# Patient Record
Sex: MALE | Race: WHITE | Employment: FULL TIME | URBAN - METROPOLITAN AREA
[De-identification: names, ages, dates, MRNs, and addresses within clinical notes are randomized per-mention and may not be internally consistent; named-entity substitution may affect disease eponyms.]

---

## 2020-01-10 ENCOUNTER — TELEPHONE (OUTPATIENT)
Dept: FAMILY MEDICINE CLINIC | Age: 50
End: 2020-01-10

## 2020-01-13 ENCOUNTER — OFFICE VISIT (OUTPATIENT)
Dept: FAMILY MEDICINE CLINIC | Age: 50
End: 2020-01-13
Payer: COMMERCIAL

## 2020-01-13 VITALS
HEART RATE: 88 BPM | RESPIRATION RATE: 14 BRPM | SYSTOLIC BLOOD PRESSURE: 128 MMHG | BODY MASS INDEX: 21.94 KG/M2 | HEIGHT: 72 IN | WEIGHT: 162 LBS | DIASTOLIC BLOOD PRESSURE: 80 MMHG

## 2020-01-13 PROCEDURE — G8484 FLU IMMUNIZE NO ADMIN: HCPCS | Performed by: FAMILY MEDICINE

## 2020-01-13 PROCEDURE — 99203 OFFICE O/P NEW LOW 30 MIN: CPT | Performed by: FAMILY MEDICINE

## 2020-01-13 PROCEDURE — 1036F TOBACCO NON-USER: CPT | Performed by: FAMILY MEDICINE

## 2020-01-13 PROCEDURE — G8427 DOCREV CUR MEDS BY ELIG CLIN: HCPCS | Performed by: FAMILY MEDICINE

## 2020-01-13 PROCEDURE — G8420 CALC BMI NORM PARAMETERS: HCPCS | Performed by: FAMILY MEDICINE

## 2020-01-13 SDOH — ECONOMIC STABILITY: FOOD INSECURITY: WITHIN THE PAST 12 MONTHS, THE FOOD YOU BOUGHT JUST DIDN'T LAST AND YOU DIDN'T HAVE MONEY TO GET MORE.: NEVER TRUE

## 2020-01-13 SDOH — ECONOMIC STABILITY: TRANSPORTATION INSECURITY
IN THE PAST 12 MONTHS, HAS THE LACK OF TRANSPORTATION KEPT YOU FROM MEDICAL APPOINTMENTS OR FROM GETTING MEDICATIONS?: NO

## 2020-01-13 SDOH — ECONOMIC STABILITY: TRANSPORTATION INSECURITY
IN THE PAST 12 MONTHS, HAS LACK OF TRANSPORTATION KEPT YOU FROM MEETINGS, WORK, OR FROM GETTING THINGS NEEDED FOR DAILY LIVING?: NO

## 2020-01-13 SDOH — ECONOMIC STABILITY: FOOD INSECURITY: WITHIN THE PAST 12 MONTHS, YOU WORRIED THAT YOUR FOOD WOULD RUN OUT BEFORE YOU GOT MONEY TO BUY MORE.: NEVER TRUE

## 2020-01-13 SDOH — ECONOMIC STABILITY: INCOME INSECURITY: HOW HARD IS IT FOR YOU TO PAY FOR THE VERY BASICS LIKE FOOD, HOUSING, MEDICAL CARE, AND HEATING?: NOT HARD AT ALL

## 2020-01-13 ASSESSMENT — ENCOUNTER SYMPTOMS
SHORTNESS OF BREATH: 0
CHEST TIGHTNESS: 0
DIARRHEA: 1
ABDOMINAL PAIN: 0
NAUSEA: 0
EYES NEGATIVE: 1
BLOOD IN STOOL: 0
VOMITING: 0

## 2020-01-13 ASSESSMENT — PATIENT HEALTH QUESTIONNAIRE - PHQ9
SUM OF ALL RESPONSES TO PHQ QUESTIONS 1-9: 0
1. LITTLE INTEREST OR PLEASURE IN DOING THINGS: 0
SUM OF ALL RESPONSES TO PHQ QUESTIONS 1-9: 0
2. FEELING DOWN, DEPRESSED OR HOPELESS: 0
SUM OF ALL RESPONSES TO PHQ9 QUESTIONS 1 & 2: 0

## 2020-01-13 NOTE — PROGRESS NOTES
Pneumococcal 0-64 years Vaccine  Aged Out         ASSESSMENT      1.  Irritable bowel syndrome with diarrhea            PLAN       Trial of low FODMAP diet    Continue probiotic    Food diary    Follow up if not better             Electronically signed by Jeri Olivia MD on 1/13/2020 at 1:36 PM

## 2020-01-13 NOTE — PATIENT INSTRUCTIONS
Patient Education        Irritable Bowel Syndrome: Care Instructions  Your Care Instructions  Irritable bowel syndrome, or IBS, is a problem with the intestines that causes belly pain, bloating, gas, constipation, and diarrhea. The cause of IBS is not well known. IBS can last for many years, but it does not get worse over time or lead to serious disease. Most people can control their symptoms by changing their diet and reducing stress. Follow-up care is a key part of your treatment and safety. Be sure to make and go to all appointments, and call your doctor if you are having problems. It's also a good idea to know your test results and keep a list of the medicines you take. How can you care for yourself at home? · For constipation:  ? Include fruits, vegetables, beans, and whole grains in your diet each day. These foods are high in fiber. ? Drink plenty of fluids. If you have kidney, heart, or liver disease and have to limit fluids, talk with your doctor before you increase the amount of fluids you drink. ? Take a fiber supplement, such as Citrucel or Metamucil, every day if needed. Read and follow all instructions on the label. ? Schedule time each day for a bowel movement. Having a daily routine may help. Take your time and do not strain when having a bowel movement. · If you often have diarrhea, limit foods and drinks that make it worse. These are different for each person but may include caffeine (found in coffee, tea, chocolate, and cola drinks), alcohol, fatty foods, gas-producing foods (such as beans, cabbage, and broccoli), some dairy products, and spicy foods. Do not eat candy or gum that contains sorbitol. · Keep a daily diary of what you eat and what symptoms you have. This may help find foods that cause you problems. · Eat slowly. Try to make mealtime relaxing. · Find ways to reduce stress. · Get at least 30 minutes of exercise on most days of the week.  Exercise can help reduce tension and isaccharides. · M onosaccharides. · A nd p olyols. If you have digestive problems, some of these foods can make your symptoms worse. When you are on this diet, you can still eat certain fruits and vegetables. You can also eat certain grains, meats, fish, and lactose-free milks. What is it used for? If you have irritable bowel syndrome (IBS), you can ease your symptoms by not eating some types of foods. Some people also use this diet for inflammatory bowel disease (IBD) or some food intolerances. High-FODMAP foods can be hard to digest. They pull more fluid into your intestines. They are also easily fermented. This can lead to bloating, belly pain, gas, and diarrhea. The low-FODMAP diet can help you figure out what foods to avoid. And it can help you find foods that are easier to digest.  This diet can help with symptoms of some digestive diseases. But it's not a cure. You will still need to manage your condition. How does it work? You will work with a doctor or dietitian when you start the diet. At first, you won't eat any high-FODMAP foods for a few weeks. Go to www.Brenco. Share[employer] Ink to learn more about this diet. Min Chappell also find links to an bryan for your phone or other device. You'll find low-FODMAP cookbooks there too. After 6 to 8 weeks, you will start to try high-FODMAP foods again. You will add those foods back to your diet, one group at a time. Your doctor or dietitian will probably have you wait a few days before you add each new group of those foods. Keep a food diary. You can write down the foods you try and note how they make you feel. After a few weeks, you may have a better idea of what foods you should avoid and what foods make you feel your best.  What are the risks? There is some risk of not getting all of the vitamins and nutrients you need on the low-FODMAP diet. These include:  · Folate. · Thiamin. · Vitamin B6.  · Calcium. · Vitamin D.   Your dietitian or doctor can help you find other sources of these if needed. This diet may limit your fiber intake. Try to plan your meals to include other sources of fiber. What foods are on the low-FODMAP diet? Here is a guide to foods that you can eat, plus the foods that you should avoid, when you are on the low-FODMAP diet. Grains  Okay to eat: Foods made from grains like arrowroot, buckwheat, corn, millet, and oats. You can also eat potato, quinoa, rice, sorghum, tapioca, and teff. Cereals, pasta, breads, corn tortillas and baked goods made from these grains are also okay. (These grains may be labeled \"gluten-free. \")  Avoid: Grains like wheat, barley, and rye. Avoid ingredients such as bulgur, couscous, durum, and semolina. And avoid cereals, breads, and pastas made from these grains. Avoid chickpea, lentil, and pea flour. Proteins  Okay to eat: Most meat, fish, and eggs without high-FODMAP sauces. You can have small amounts of almonds or hazelnuts (10 nuts). Macadamia nuts, peanuts, pecans, pine nuts, and walnuts are also okay. You can also eat frankie and pumpkin seeds, tofu, and tempeh. Avoid: Beans, chickpeas, lentils, and soybeans. Avoid pistachio and cashew nuts. And some sausages may have high-FODMAP ingredients. Dairy  Okay to eat: Lactose-free dairy milks. Rice milk and almond milk are okay. So are lactose-free yogurts, kefirs, ice creams, and sorbet from low-FODMAP fruits and sweeteners. (These are often labeled \"lactose-free. \") You can have small amounts (2 Tbsp) of cottage, cream, or ricotta cheese. Hard cheeses like cheddar, [place], ROSS, and Swiss are okay. So are small amounts (1 oz) of aged or ripened cheeses like Brie, blue, and feta. Avoid: Milk, including cow, goat, and sheep. Avoid condensed or evaporated milk, buttermilk, custard, cream, sour cream, yogurt, and ice cream. Avoid soy milk. (Check sauces for dairy ingredients.)  Vegetables  Okay to eat:  Bamboo shoots, bell peppers, bok jocelyn, up to ½ cup of broccoli or cabbage and gravies made with onion or garlic. Avoid pickles, relish, some salad dressings and soup stocks, salsa, and tomato paste. And avoid sauces and other foods with high fructose corn syrup, honey, molasses, and agave. Avoid artificial sweeteners (isomalt, mannitol, malitol, sorbitol, and xylitol). Avoid corn syrup solids, fructose, fruit juice concentrate, and polydextrose. Other foods and drinks  Okay to have: Water, soda water, tonic, soft drinks sweetened with sugar, ½ cup of low-FODMAP fruit juice, and most teas and alcohols. You can also eat foods made with baking powder and soda, cocoa, and gelatin. Avoid: Juices from high-FODMAP fruits and vegetables. And avoid fortified hal, chamomile and fennel teas, chicory-based drinks and coffee substitutes, and bouillon cubes. Follow-up care is a key part of your treatment and safety. Be sure to make and go to all appointments, and call your doctor if you are having problems. It's also a good idea to know your test results and keep a list of the medicines you take. Where can you learn more? Go to https://Jalbumpepiceweb.[employer]. org and sign in to your [email or web address] account. Enter L235 in the [facility] box to learn more about \"Learning About the Low FODMAP Diet for Irritable Bowel Syndrome (IBS). \"     If you do not have an account, please click on the \"Sign Up Now\" link. Current as of: August 21, 2019  Content Version: 12.3  © 8427-8545 Healthwise, Incorporated. Care instructions adapted under license by [facility] ([facility]). If you have questions about a medical condition or this instruction, always ask your healthcare professional. [place] [address] any warranty or liability for your use of this information.

## 2020-06-02 ENCOUNTER — OFFICE VISIT (OUTPATIENT)
Dept: FAMILY MEDICINE CLINIC | Age: 50
End: 2020-06-02
Payer: COMMERCIAL

## 2020-06-02 VITALS
RESPIRATION RATE: 14 BRPM | BODY MASS INDEX: 21.62 KG/M2 | HEART RATE: 84 BPM | TEMPERATURE: 98.9 F | DIASTOLIC BLOOD PRESSURE: 80 MMHG | WEIGHT: 159.6 LBS | HEIGHT: 72 IN | SYSTOLIC BLOOD PRESSURE: 132 MMHG

## 2020-06-02 PROCEDURE — 99213 OFFICE O/P EST LOW 20 MIN: CPT | Performed by: FAMILY MEDICINE

## 2020-06-02 PROCEDURE — 1036F TOBACCO NON-USER: CPT | Performed by: FAMILY MEDICINE

## 2020-06-02 PROCEDURE — G8427 DOCREV CUR MEDS BY ELIG CLIN: HCPCS | Performed by: FAMILY MEDICINE

## 2020-06-02 PROCEDURE — G8420 CALC BMI NORM PARAMETERS: HCPCS | Performed by: FAMILY MEDICINE

## 2020-06-02 RX ORDER — HYDROCORTISONE ACETATE PRAMOXINE HCL 2.5; 1 G/100G; G/100G
CREAM TOPICAL 3 TIMES DAILY
Qty: 1 TUBE | Refills: 1 | Status: SHIPPED | OUTPATIENT
Start: 2020-06-02 | End: 2020-10-29 | Stop reason: ALTCHOICE

## 2020-06-02 RX ORDER — IBUPROFEN 200 MG
200 TABLET ORAL EVERY 6 HOURS PRN
COMMUNITY
End: 2020-10-29 | Stop reason: ALTCHOICE

## 2020-06-02 ASSESSMENT — ENCOUNTER SYMPTOMS
NAUSEA: 0
BLOOD IN STOOL: 0
DIARRHEA: 0
ABDOMINAL PAIN: 0
RESPIRATORY NEGATIVE: 1
RECTAL PAIN: 1
CONSTIPATION: 0

## 2020-06-02 NOTE — PROGRESS NOTES
Chief Complaint   Patient presents with    Hemorrhoids     started over a week ago, OTC remedies not helping, no bleeding          SUBJECTIVE     Florinda Hodges is a 51 y.o.male      Pt complains of a 1 week h/o perirectal pain and inflammation around an external hemorrhoid. He has tried sitz baths and Prep H without much relief. Denies constipation or bleeding. Hurts with prolonged sitting. Doesn't bother him with standing or walking. He would like a Rx med for this. Review of Systems   Constitutional: Negative for fatigue and fever. HENT: Negative. Respiratory: Negative. Cardiovascular: Negative. Gastrointestinal: Positive for rectal pain. Negative for abdominal pain, blood in stool, constipation, diarrhea and nausea. Neurological: Negative. All other systems reviewed and are negative. OBJECTIVE     /80   Pulse 84   Temp 98.9 °F (37.2 °C) (Temporal)   Resp 14   Ht 6' 0.24\" (1.835 m)   Wt 159 lb 9.6 oz (72.4 kg)   BMI 21.50 kg/m²     Physical Exam  Vitals signs reviewed. Constitutional:       General: He is not in acute distress. Appearance: Normal appearance. Genitourinary:     Rectum: External hemorrhoid present. No anal fissure. Neurological:      Mental Status: He is alert. ASSESSMENT      1.  Inflamed external hemorrhoid        PLAN     Requested Prescriptions     Signed Prescriptions Disp Refills    Hydrocort-Pramoxine, Perianal, (ANALPRAM-HC) 2.5-1 % rectal cream 1 Tube 1     Sig: Place rectally 3 times daily     Sitz baths    Tucks pads    Follow up if not better            Electronically signed by Simone Gallegos MD on 6/2/2020 at 12:15 PM

## 2020-08-10 ENCOUNTER — OFFICE VISIT (OUTPATIENT)
Dept: FAMILY MEDICINE CLINIC | Age: 50
End: 2020-08-10
Payer: COMMERCIAL

## 2020-08-10 VITALS
TEMPERATURE: 97.1 F | SYSTOLIC BLOOD PRESSURE: 162 MMHG | RESPIRATION RATE: 16 BRPM | DIASTOLIC BLOOD PRESSURE: 84 MMHG | HEART RATE: 104 BPM | BODY MASS INDEX: 20.85 KG/M2 | WEIGHT: 154.8 LBS

## 2020-08-10 PROCEDURE — 99396 PREV VISIT EST AGE 40-64: CPT | Performed by: FAMILY MEDICINE

## 2020-08-10 ASSESSMENT — ENCOUNTER SYMPTOMS
CHEST TIGHTNESS: 0
DIARRHEA: 1
EYES NEGATIVE: 1
VOMITING: 0
BLOOD IN STOOL: 0
ABDOMINAL PAIN: 1
SHORTNESS OF BREATH: 0
NAUSEA: 0

## 2020-08-10 NOTE — PROGRESS NOTES
Chief Complaint   Patient presents with    Diarrhea     C/O symptoms of diarrhea that have started to happen again x 1 month.  Annual Exam           SUBJECTIVE     Storm Zhao is a 51 y.o.male      Pt complains of a return of diarrhea over the last month. He has been taking probiotics and his bowels were improved for a number of months. He is unsure of why they are loose again. Denies black or bloody stools. Has lost about 8# in the last 6 months. C/o some abdominal cramping before he has diarrhea. Usually happens in the morning. Due for yearly labs. Nonsmoker. Body mass index is 20.85 kg/m². BPs are high today. He states that he has \"white coat\" HTN      Review of Systems   Constitutional: Positive for unexpected weight change (loss). Negative for chills, fatigue and fever. HENT: Negative. Eyes: Negative. Respiratory: Negative for chest tightness and shortness of breath. Cardiovascular: Negative for chest pain, palpitations and leg swelling. Gastrointestinal: Positive for abdominal pain and diarrhea. Negative for blood in stool, nausea and vomiting. Genitourinary: Negative for dysuria. Musculoskeletal: Negative. Skin: Negative for rash. Neurological: Negative for dizziness. Psychiatric/Behavioral: Negative. All other systems reviewed and are negative. OBJECTIVE     BP (!) 162/84   Pulse 104   Temp 97.1 °F (36.2 °C) (Temporal)   Resp 16   Wt 154 lb 12.8 oz (70.2 kg)   BMI 20.85 kg/m²     BP Readings from Last 3 Encounters:   08/10/20 (!) 162/84   06/02/20 132/80   01/13/20 128/80     Wt Readings from Last 3 Encounters:   08/10/20 154 lb 12.8 oz (70.2 kg)   06/02/20 159 lb 9.6 oz (72.4 kg)   01/13/20 162 lb (73.5 kg)       Physical Exam  Vitals signs reviewed. Constitutional:       General: He is not in acute distress. Appearance: He is well-developed. HENT:      Head: Normocephalic and atraumatic.       Right Ear: Tympanic membrane normal.      Left Ear: Tympanic membrane normal.      Mouth/Throat:      Mouth: Mucous membranes are moist.      Pharynx: No posterior oropharyngeal erythema. Eyes:      Conjunctiva/sclera: Conjunctivae normal.   Neck:      Musculoskeletal: Neck supple. Thyroid: No thyromegaly. Vascular: No carotid bruit. Cardiovascular:      Rate and Rhythm: Normal rate and regular rhythm. Heart sounds: No murmur. Pulmonary:      Effort: Pulmonary effort is normal.      Breath sounds: Normal breath sounds. No wheezing. Abdominal:      General: Bowel sounds are normal.      Palpations: Abdomen is soft. Tenderness: There is no abdominal tenderness. Lymphadenopathy:      Cervical: No cervical adenopathy. Skin:     General: Skin is warm and dry. Findings: No rash. Neurological:      Mental Status: He is alert and oriented to person, place, and time. Psychiatric:         Behavior: Behavior normal.           ASSESSMENT      1. Annual physical exam    2.  Diarrhea, unspecified type        PLAN     Update labs as below    Refer to GI for diarrhea and abdominal symptoms    Continue probiotics      Orders Placed This Encounter   Procedures    CBC Auto Differential     Standing Status:   Future     Standing Expiration Date:   8/11/2021    Comprehensive Metabolic Panel     Standing Status:   Future     Standing Expiration Date:   8/10/2021    Lipid Panel     Standing Status:   Future     Standing Expiration Date:   8/10/2021     Order Specific Question:   Is Patient Fasting?/# of Hours     Answer:   12    TSH without Reflex     Standing Status:   Future     Standing Expiration Date:   8/11/2021    T4, Free     Standing Status:   Future     Standing Expiration Date:   8/10/2021    MAGALI - Ronda Corbin MD, Gastroenterology, [facility]     Referral Priority:   Routine     Referral Type:   Eval and Treat     Referral Reason:   Specialty Services Required     Referred to Provider:   Ericka Hagan MD     Requested Specialty:   Gastroenterology     Number of Visits Requested:   1         Follow up if not better            Electronically signed by Misael Hernández MD on 8/10/2020 at 5:02 PM

## 2020-08-13 ENCOUNTER — HOSPITAL ENCOUNTER (OUTPATIENT)
Age: 50
Discharge: HOME | End: 2020-08-13
Payer: COMMERCIAL

## 2020-08-13 LAB
ALBUMIN SERPL-MCNC: 3.5 G/DL (ref 3.5–5.1)
ALP BLD-CCNC: 100 U/L (ref 38–126)
ALT SERPL-CCNC: 11 U/L (ref 11–66)
ANION GAP SERPL CALCULATED.3IONS-SCNC: 11 MEQ/L (ref 8–16)
AST SERPL-CCNC: 18 U/L (ref 5–40)
BASOPHILS # BLD: 1.1 %
BASOPHILS ABSOLUTE: 0.1 THOU/MM3 (ref 0–0.1)
BILIRUB SERPL-MCNC: 0.8 MG/DL (ref 0.3–1.2)
BUN BLDV-MCNC: 14 MG/DL (ref 7–22)
CALCIUM SERPL-MCNC: 9 MG/DL (ref 8.5–10.5)
CHLORIDE BLD-SCNC: 104 MEQ/L (ref 98–111)
CHOLESTEROL, TOTAL: 134 MG/DL (ref 100–199)
CO2: 26 MEQ/L (ref 23–33)
CREAT SERPL-MCNC: 1 MG/DL (ref 0.4–1.2)
EOSINOPHIL # BLD: 1 %
EOSINOPHILS ABSOLUTE: 0.1 THOU/MM3 (ref 0–0.4)
ERYTHROCYTE [DISTWIDTH] IN BLOOD BY AUTOMATED COUNT: 12.3 % (ref 11.5–14.5)
ERYTHROCYTE [DISTWIDTH] IN BLOOD BY AUTOMATED COUNT: 38.9 FL (ref 35–45)
GFR SERPL CREATININE-BSD FRML MDRD: 80 ML/MIN/1.73M2
GLUCOSE BLD-MCNC: 98 MG/DL (ref 70–108)
HCT VFR BLD CALC: 46.2 % (ref 42–52)
HDLC SERPL-MCNC: 37 MG/DL
HEMOGLOBIN: 15.4 GM/DL (ref 14–18)
IMMATURE GRANS (ABS): 0.03 THOU/MM3 (ref 0–0.07)
IMMATURE GRANULOCYTES: 0.4 %
LDL CHOLESTEROL CALCULATED: 80 MG/DL
LYMPHOCYTES # BLD: 20.4 %
LYMPHOCYTES ABSOLUTE: 1.5 THOU/MM3 (ref 1–4.8)
MCH RBC QN AUTO: 29.1 PG (ref 26–33)
MCHC RBC AUTO-ENTMCNC: 33.3 GM/DL (ref 32.2–35.5)
MCV RBC AUTO: 87.2 FL (ref 80–94)
MONOCYTES # BLD: 12.2 %
MONOCYTES ABSOLUTE: 0.9 THOU/MM3 (ref 0.4–1.3)
NUCLEATED RED BLOOD CELLS: 0 /100 WBC
PLATELET # BLD: 340 THOU/MM3 (ref 130–400)
PMV BLD AUTO: 8.6 FL (ref 9.4–12.4)
POTASSIUM SERPL-SCNC: 3.8 MEQ/L (ref 3.5–5.2)
RBC # BLD: 5.3 MILL/MM3 (ref 4.7–6.1)
SEG NEUTROPHILS: 64.9 %
SEGMENTED NEUTROPHILS ABSOLUTE COUNT: 4.8 THOU/MM3 (ref 1.8–7.7)
SODIUM BLD-SCNC: 141 MEQ/L (ref 135–145)
T4 FREE: 1.66 NG/DL (ref 0.93–1.76)
TOTAL PROTEIN: 6.9 G/DL (ref 6.1–8)
TRIGL SERPL-MCNC: 86 MG/DL (ref 0–199)
TSH SERPL DL<=0.05 MIU/L-ACNC: 1.93 UIU/ML (ref 0.4–4.2)
WBC # BLD: 7.4 THOU/MM3 (ref 4.8–10.8)

## 2020-08-13 PROCEDURE — 84443 ASSAY THYROID STIM HORMONE: CPT

## 2020-08-13 PROCEDURE — 85025 COMPLETE CBC W/AUTO DIFF WBC: CPT

## 2020-08-13 PROCEDURE — 36415 COLL VENOUS BLD VENIPUNCTURE: CPT

## 2020-08-13 PROCEDURE — 84439 ASSAY OF FREE THYROXINE: CPT

## 2020-08-13 PROCEDURE — 80061 LIPID PANEL: CPT

## 2020-08-13 PROCEDURE — 80053 COMPREHEN METABOLIC PANEL: CPT

## 2020-08-20 ENCOUNTER — HOSPITAL ENCOUNTER (OUTPATIENT)
Age: 50
Setting detail: SPECIMEN
Discharge: HOME | End: 2020-08-20
Payer: COMMERCIAL

## 2020-08-20 LAB
ADENOVIRUS F 40 41 PCR: NOT DETECTED
ASTROVIRUS PCR: NOT DETECTED
CAMPYLOBACTER PCR: NOT DETECTED
CLOSTRIDIUM DIFFICILE, PCR: NOT DETECTED
CRYPTOSPORIDIUM PCR: NOT DETECTED
CYCLOSPORA CAYETANENSIS PCR: NOT DETECTED
E COLI 0157 PCR: NORMAL
E COLI ENTEROAGGREGATIVE PCR: NOT DETECTED
E COLI ENTEROPATHOGENIC PCR: NOT DETECTED
E COLI ENTEROTOXIGENIC PCR: NOT DETECTED
E COLI SHIGA LIKE TOXIN PCR: NOT DETECTED
E COLI SHIGELLA/ENTEROINVASIVE PCR: NOT DETECTED
E HISTOLYTICA GI FILM ARRAY: NOT DETECTED
GIARDIA LAMBLIA PCR: NOT DETECTED
HEMOCCULT STL QL: NEGATIVE
NOROVIRUS GI GII PCR: NOT DETECTED
PLESIOMONAS SHIGELLOIDES PCR: NOT DETECTED
REASON FOR REJECTION: NORMAL
REJECTED TEST: NORMAL
ROTAVIRUS A PCR: NOT DETECTED
SALMONELLA PCR: NOT DETECTED
SAPOVIRUS PCR: NOT DETECTED
VIBRIO CHOLERAE PCR: NOT DETECTED
VIBRIO PCR: NOT DETECTED
YERSINIA ENTEROCOLITICA PCR: NOT DETECTED

## 2020-08-20 PROCEDURE — 83993 ASSAY FOR CALPROTECTIN FECAL: CPT

## 2020-08-20 PROCEDURE — 0097U HC GI PTHGN MULT REV TRANS & AMP PRB TECH 22 TRGT: CPT

## 2020-08-20 PROCEDURE — 82272 OCCULT BLD FECES 1-3 TESTS: CPT

## 2020-08-25 LAB — CALPROTECTIN: 1810 UG/G

## 2020-10-01 ENCOUNTER — TELEMEDICINE (OUTPATIENT)
Dept: FAMILY MEDICINE CLINIC | Age: 50
End: 2020-10-01
Payer: COMMERCIAL

## 2020-10-01 PROCEDURE — 99213 OFFICE O/P EST LOW 20 MIN: CPT | Performed by: FAMILY MEDICINE

## 2020-10-01 PROCEDURE — G8427 DOCREV CUR MEDS BY ELIG CLIN: HCPCS | Performed by: FAMILY MEDICINE

## 2020-10-01 RX ORDER — CIPROFLOXACIN 500 MG/1
500 TABLET, FILM COATED ORAL 2 TIMES DAILY
Qty: 20 TABLET | Refills: 0 | Status: SHIPPED | OUTPATIENT
Start: 2020-10-01 | End: 2020-10-11

## 2020-10-01 RX ORDER — BUDESONIDE 3 MG/1
3 CAPSULE, COATED PELLETS ORAL DAILY
COMMUNITY
Start: 2020-09-29 | End: 2020-10-29 | Stop reason: DRUGHIGH

## 2020-10-01 ASSESSMENT — ENCOUNTER SYMPTOMS
BACK PAIN: 1
RESPIRATORY NEGATIVE: 1
ABDOMINAL PAIN: 1

## 2020-10-01 NOTE — PATIENT INSTRUCTIONS
Patient Education        Prostatitis: Care Instructions  Your Care Instructions     The prostate gland is a small, walnut-shaped organ. It lies just below a man's bladder. It surrounds the urethra, the tube that carries urine through the penis and out of the body. Prostatitis is a painful condition caused by inflammation or infection of the prostate gland. Sometimes the condition is caused by bacteria, but often the cause is not known. Prostatitis caused by bacteria usually is treated with self-care and antibiotics. Follow-up care is a key part of your treatment and safety. Be sure to make and go to all appointments, and call your doctor if you are having problems. It's also a good idea to know your test results and keep a list of the medicines you take. How can you care for yourself at home? · If your doctor prescribed antibiotics, take them as directed. Do not stop taking them just because you feel better. You need to take the full course of antibiotics. · Take an over-the-counter pain medicine, such as acetaminophen (Tylenol), ibuprofen (Advil, Motrin), or naproxen (Aleve). Be safe with medicines. Read and follow all instructions on the label. · Take warm baths to help soothe pain. · Straining to pass stools can hurt when your prostate is inflamed. Avoid constipation. ? Include fruits, vegetables, beans, and whole grains in your diet each day. These foods are high in fiber. ? Drink plenty of fluids, enough so that your urine is light yellow or clear like water. If you have kidney, heart, or liver disease and have to limit fluids, talk with your doctor before you increase the amount of fluids you drink. ? Get some exercise every day. Build up slowly to 30 to 60 minutes a day on 5 or more days of the week. ? Take a fiber supplement, such as Citrucel or Metamucil, every day if needed. Read and follow all instructions on the label. ? Schedule time each day for a bowel movement.  Having a daily routine may help. Take your time and do not strain when having a bowel movement. · Avoid alcohol, caffeine, and spicy foods, especially if they make your symptoms worse. When should you call for help? Call your doctor now or seek immediate medical care if:  · You have symptoms of a urinary tract infection. These may include:  ? Pain or burning when you urinate. ? A frequent need to urinate without being able to pass much urine. ? Pain in the flank, which is just below the rib cage and above the waist on either side of the back. ? Blood in your urine. ? A fever. Watch closely for changes in your health, and be sure to contact your doctor if:  · You cannot empty your bladder completely. · You do not get better as expected. Where can you learn more? Go to https://SalesLoft.Haozu.com. org and sign in to your [employer] account. Enter V364 in the [employer]Flow box to learn more about \"Prostatitis: Care Instructions. \"     If you do not have an account, please click on the \"Sign Up Now\" link. Current as of: February 11, 2020               Content Version: 12.5  © 3121-0833 Healthwise, Incorporated. Care instructions adapted under license by [facility] ([facility]). If you have questions about a medical condition or this instruction, always ask your healthcare professional. [address] any warranty or liability for your use of this information.

## 2020-10-01 NOTE — PROGRESS NOTES
10/1/2020          Patient location:  Home  Provider location:  [facility]  Accept Video Visit Billing:  Yes    TELEHEALTH EVALUATION -- Audio/Visual (During [id number] public health emergency)    HPI:    Maureen Zuñiga (:  1970) has requested an audio/video evaluation for the following concern(s):    C/o 1 month h/o urinary frequency, dysuria, perineal pain. He has some lower back and lower abdominal pressure at times. No fever, chills, sweats. He recently had a GI workup and is now being treated for possible Crohn's disease. He denies testicular pain. He feels like he has an infection and would like to try some antibiotics before coming in for any exam.    Review of Systems   Constitutional: Negative for chills, diaphoresis and fever. HENT: Negative. Respiratory: Negative. Cardiovascular: Negative. Gastrointestinal: Positive for abdominal pain. Genitourinary: Positive for dysuria and frequency. Negative for discharge, hematuria, scrotal swelling, testicular pain and urgency. Musculoskeletal: Positive for back pain. All other systems reviewed and are negative. Outpatient Medications Prior to Visit   Medication Sig Dispense Refill    budesonide (ENTOCORT EC) 3 MG extended release capsule Take 3 capsules by mouth daily      ibuprofen (ADVIL;MOTRIN) 200 MG tablet Take 200 mg by mouth every 6 hours as needed for Pain      Hydrocort-Pramoxine, Perianal, (ANALPRAM-HC) 2.5-1 % rectal cream Place rectally 3 times daily (Patient not taking: Reported on 8/10/2020) 1 Tube 1    Probiotic Product (PROBIOTIC ADVANCED PO) Take by mouth daily        No facility-administered medications prior to visit. Social History     Tobacco Use    Smoking status: Never Smoker    Smokeless tobacco: Never Used   Substance Use Topics    Alcohol use: No    Drug use:  No            PHYSICAL EXAMINATION:  [ INSTRUCTIONS:  \"[x]\" Indicates a positive item  \"[]\" Indicates a negative item  -- DELETE ALL ITEMS NOT EXAMINED]  Vital Signs: (As obtained by patient/caregiver or practitioner observation)     Blood pressure-          Heart rate-         Respiratory rate-         Temperature-            Pulse oximetry-      Constitutional: [x] Appears well-developed and well-nourished [x] No apparent distress                            [] Abnormal-   Mental status  [x] Alert and awake  [x] Oriented to person/place/time [x]Able to follow commands       Eyes:  EOM    [x]  Normal  [] Abnormal-  Sclera  [x]  Normal  [] Abnormal -         Discharge [x]  None visible  [] Abnormal -     HENT:   [x] Normocephalic, atraumatic. [] Abnormal   [x] Mouth/Throat: Mucous membranes are moist.      External Ears [x] Normal  [] Abnormal-      Neck: [x] No visualized mass      Pulmonary/Chest: [x] Respiratory effort normal.  [x] No visualized signs of difficulty breathing or respiratory distress        [] Abnormal-      Musculoskeletal:   [] Normal gait with no signs of ataxia         [x] Normal range of motion of neck        [] Abnormal-         Neurological:        [x] No Facial Asymmetry (Cranial nerve 7 motor function) (limited exam to video visit)                       [x] No gaze palsy        [] Abnormal-         Skin:                     [x] No significant exanthematous lesions or discoloration noted on facial skin         [] Abnormal-                                  Psychiatric:           [x] Normal Affect [] No Hallucinations        [] Abnormal-      Other pertinent observable physical exam findings- none      ASSESSMENT/PLAN:    1. Acute prostatitis    Will treat for presumed prostatitis as below. Appt for in person evaluation if not better. - ciprofloxacin (CIPRO) 500 MG tablet; Take 1 tablet by mouth 2 times daily for 10 days  Dispense: 20 tablet; Refill: 0    2. Dysuria    - ciprofloxacin (CIPRO) 500 MG tablet; Take 1 tablet by mouth 2 times daily for 10 days  Dispense: 20 tablet;  Refill: [id number]

## 2020-10-29 ENCOUNTER — OFFICE VISIT (OUTPATIENT)
Dept: FAMILY MEDICINE CLINIC | Age: 50
End: 2020-10-29
Payer: COMMERCIAL

## 2020-10-29 VITALS
BODY MASS INDEX: 20.69 KG/M2 | RESPIRATION RATE: 16 BRPM | SYSTOLIC BLOOD PRESSURE: 154 MMHG | DIASTOLIC BLOOD PRESSURE: 100 MMHG | HEART RATE: 92 BPM | WEIGHT: 153.6 LBS | TEMPERATURE: 97 F

## 2020-10-29 PROCEDURE — G8427 DOCREV CUR MEDS BY ELIG CLIN: HCPCS | Performed by: FAMILY MEDICINE

## 2020-10-29 PROCEDURE — G8420 CALC BMI NORM PARAMETERS: HCPCS | Performed by: FAMILY MEDICINE

## 2020-10-29 PROCEDURE — 1036F TOBACCO NON-USER: CPT | Performed by: FAMILY MEDICINE

## 2020-10-29 PROCEDURE — 99213 OFFICE O/P EST LOW 20 MIN: CPT | Performed by: FAMILY MEDICINE

## 2020-10-29 PROCEDURE — G8482 FLU IMMUNIZE ORDER/ADMIN: HCPCS | Performed by: FAMILY MEDICINE

## 2020-10-29 RX ORDER — ACETAMINOPHEN 325 MG/1
650 TABLET ORAL EVERY 6 HOURS PRN
COMMUNITY

## 2020-10-29 RX ORDER — AMOXICILLIN AND CLAVULANATE POTASSIUM 875; 125 MG/1; MG/1
1 TABLET, FILM COATED ORAL 2 TIMES DAILY
Qty: 20 TABLET | Refills: 0 | Status: SHIPPED | OUTPATIENT
Start: 2020-10-29 | End: 2020-11-08

## 2020-10-29 RX ORDER — BUDESONIDE 3 MG/1
6 CAPSULE, COATED PELLETS ORAL DAILY
Status: SHIPPED | COMMUNITY
Start: 2020-10-29 | End: 2021-01-21

## 2020-10-29 ASSESSMENT — ENCOUNTER SYMPTOMS: RESPIRATORY NEGATIVE: 1

## 2020-10-29 NOTE — PROGRESS NOTES
Chief Complaint   Patient presents with    Other     Recently seen for VV for prostatitis. Completed 10 days of Cipro. (2 weeks ago) Patient states that symptoms never resolved but did improve. Still with pressure in scrotal area. Donavon May is a 51 y.o.male      Pt complains of tenderness in the perineal area that is worse with sitting or prolonged standing. He states that the tenderness doesn't bother him when he lays flat. No known trauma to the area. No fever, chills. He states that it feels like there is a nerve irritation in the area. We did a virtual visit where he reported similar symptoms on 10/1/20 and he was treated with cipro for possible prostatitis. He states that he improved slightly but they symptoms never resolved. He is otherwise well. He brings home BPs for review. They run from 120-130s/80s. Review of Systems   Constitutional: Negative for fatigue and fever. HENT: Negative. Respiratory: Negative. Cardiovascular: Negative. Genitourinary: Negative for difficulty urinating, scrotal swelling and testicular pain. Musculoskeletal: Negative. All other systems reviewed and are negative. OBJECTIVE     BP (!) 154/100   Pulse 92   Temp 97 °F (36.1 °C) (Temporal)   Resp 16   Wt 153 lb 9.6 oz (69.7 kg)   BMI 20.69 kg/m²     Physical Exam  Vitals signs reviewed. Constitutional:       General: He is not in acute distress. Genitourinary:      Neurological:      Mental Status: He is alert. ASSESSMENT      1. Cellulitis of perineum    2. Perineal pain in male    3. Transient elevated blood pressure        PLAN     Requested Prescriptions     Signed Prescriptions Disp Refills    amoxicillin-clavulanate (AUGMENTIN) 875-125 MG per tablet 20 tablet 0     Sig: Take 1 tablet by mouth 2 times daily for 10 days     Ddx includes cellulitis, pudendal neuralgia, prostatitis.     Will treat with augmentin for possible cellulitis, as he improved slightly with antibiotics previously.     Avoid prolonged sitting on hard surfaces    Urology eval if not improved            Electronically signed by Lima Ramirez MD on 10/29/2020 at 11:15 AM

## 2020-11-17 ENCOUNTER — TELEPHONE (OUTPATIENT)
Dept: FAMILY MEDICINE CLINIC | Age: 50
End: 2020-11-17

## 2020-11-17 NOTE — TELEPHONE ENCOUNTER
Please refer to office visits from 10/1 and 10/29. Message left on nurse NANCY. States that ATB is not helping and wonders if he should be pursuing a referral to urology at this point. If so, recommendations?

## 2020-11-19 ENCOUNTER — TELEPHONE (OUTPATIENT)
Dept: FAMILY MEDICINE CLINIC | Age: 50
End: 2020-11-19

## 2020-11-19 NOTE — TELEPHONE ENCOUNTER
Discussed with patient and referral placed to [facility]. They will call the patient to schedule.

## 2020-11-19 NOTE — TELEPHONE ENCOUNTER
The medication he was taking was not working, wants to know if he should go ahead and schedule with Urology or speak with the Dr again, or what the next steps would be.

## 2020-12-08 ENCOUNTER — OFFICE VISIT (OUTPATIENT)
Dept: UROLOGY | Age: 50
End: 2020-12-08
Payer: COMMERCIAL

## 2020-12-08 VITALS — TEMPERATURE: 97.1 F | BODY MASS INDEX: 21.54 KG/M2 | WEIGHT: 159 LBS | HEIGHT: 72 IN

## 2020-12-08 LAB
BILIRUBIN URINE: NEGATIVE
BLOOD URINE, POC: ABNORMAL
CHARACTER, URINE: CLEAR
COLOR, URINE: YELLOW
GLUCOSE URINE: NEGATIVE MG/DL
KETONES, URINE: NEGATIVE
LEUKOCYTE CLUMPS, URINE: NEGATIVE
NITRITE, URINE: NEGATIVE
PH, URINE: 6 (ref 5–9)
POST VOID RESIDUAL (PVR): 0 ML
PROTEIN, URINE: NEGATIVE MG/DL
SPECIFIC GRAVITY, URINE: >= 1.03 (ref 1–1.03)
UROBILINOGEN, URINE: 0.2 EU/DL (ref 0–1)

## 2020-12-08 PROCEDURE — 1036F TOBACCO NON-USER: CPT | Performed by: UROLOGY

## 2020-12-08 PROCEDURE — G8482 FLU IMMUNIZE ORDER/ADMIN: HCPCS | Performed by: UROLOGY

## 2020-12-08 PROCEDURE — 81003 URINALYSIS AUTO W/O SCOPE: CPT | Performed by: UROLOGY

## 2020-12-08 PROCEDURE — 51798 US URINE CAPACITY MEASURE: CPT | Performed by: UROLOGY

## 2020-12-08 PROCEDURE — 99204 OFFICE O/P NEW MOD 45 MIN: CPT | Performed by: UROLOGY

## 2020-12-08 PROCEDURE — G8427 DOCREV CUR MEDS BY ELIG CLIN: HCPCS | Performed by: UROLOGY

## 2020-12-08 PROCEDURE — G8420 CALC BMI NORM PARAMETERS: HCPCS | Performed by: UROLOGY

## 2020-12-08 RX ORDER — GABAPENTIN 300 MG/1
300 CAPSULE ORAL NIGHTLY
Qty: 30 CAPSULE | Refills: 5 | Status: SHIPPED | OUTPATIENT
Start: 2020-12-08 | End: 2021-03-18

## 2020-12-08 RX ORDER — OMEGA-3 FATTY ACIDS/FISH OIL 300-1000MG
CAPSULE ORAL
COMMUNITY

## 2020-12-08 RX ORDER — TAMSULOSIN HYDROCHLORIDE 0.4 MG/1
0.4 CAPSULE ORAL DAILY
Qty: 30 CAPSULE | Refills: 11 | Status: SHIPPED | OUTPATIENT
Start: 2020-12-08 | End: 2021-03-18

## 2020-12-08 RX ORDER — DOXYCYCLINE HYCLATE 100 MG
100 TABLET ORAL 2 TIMES DAILY
Qty: 60 TABLET | Refills: 0 | Status: SHIPPED | OUTPATIENT
Start: 2020-12-08 | End: 2021-01-07

## 2020-12-08 NOTE — PROGRESS NOTES
Caprice Whaley MD        [address] [place] [address].  [address]  [facility] [address]  Dept: [phone]  Dept Fax: [phone]: [address] [address] Urology Office Note -     Patient: Blessing Junior  YOB: 1970  Date: 12/8/2020    The patient is a 53 y.o. male who presents today for evaluation of the following problems: Prostatitis   Chief Complaint   Patient presents with    Consultation     New Pateint- Ref by Rito Sport -Perineal Pain     referred/consultation requested by Bre Schultz MD.    HISTORY OF PRESENT ILLNESS:     Prostatitis   Onset was weeks ago. Overall, the problem(s) are unchanged. Severity is described as moderate. Associated Symptoms: No dysuria, no gross hematuria. Current Pain Severity: 0    Pt recently seen by PCP- Dr. Sera Rao  Pt complains of tenderness in the perineal area that worsens with prolonged sitting or standing. Reports no trauma to the area. He was treated in October 2020 with Cipro for 10 days for possible prostatitis. Symptoms improved slightly but never resolved. No imaging obtained. UA today negative. PVR today- completely empty. Summary of Previous Records:  Pt complains of tenderness in the perineal area that is worse with sitting or prolonged standing. He states that the tenderness doesn't bother him when he lays flat. No known trauma to the area. No fever, chills. He states that it feels like there is a nerve irritation in the area. We did a virtual visit where he reported similar symptoms on 10/1/20 and he was treated with cipro for possible prostatitis. He states that he improved slightly but they symptoms never resolved. He is otherwise well.         Requested/reviewed records from Bre Schultz MD office and/or outside physician/EMR    (Patient's old records have been requested, reviewed and pertinent findings summarized in today's note.)    Procedures Today: N/A      Last several PSA's:  No results found for: PSA    Last total testosterone:  No results found for: TESTOSTERONE    Urinalysis today:  Results for POC orders placed in visit on 12/08/20   POCT Urinalysis No Micro (Auto)   Result Value Ref Range    Glucose, Ur Negative NEGATIVE mg/dl    Bilirubin Urine Negative     Ketones, Urine Negative NEGATIVE    Specific Gravity, Urine >= 1.030 1.002 - 1.030    Blood, UA POC Small (A) NEGATIVE    pH, Urine 6.00 5.0 - 9.0    Protein, Urine Negative NEGATIVE mg/dl    Urobilinogen, Urine 0.20 0.0 - 1.0 eu/dl    Nitrite, Urine Negative NEGATIVE    Leukocyte Clumps, Urine Negative NEGATIVE    Color, Urine Yellow YELLOW-STRAW    Character, Urine Clear CLR-SL.CLOUD   poct post void residual   Result Value Ref Range    post void residual 0 ml       Last BUN and creatinine:  Lab Results   Component Value Date    BUN 14 08/13/2020     Lab Results   Component Value Date    CREATININE 1.0 08/13/2020         Imaging Reviewed during this Office Visit:   Caprice Whaley MD independently reviewed the images and verified the radiology reports from:    No results found.     PAST MEDICAL, FAMILY AND SOCIAL HISTORY:  Past Medical History:   Diagnosis Date    Bowel trouble      Past Surgical History:   Procedure Laterality Date    COLONOSCOPY  2020     Family History   Problem Relation Age of Onset    Asthma Mother     COPD Mother     Other Father         aortic aneurysm    Stroke Paternal Grandfather      Outpatient Medications Marked as Taking for the 12/8/20 encounter (Office Visit) with Aamir Clark MD   Medication Sig Dispense Refill    ibuprofen (ADVIL;MOTRIN) 200 MG CAPS       acetaminophen (TYLENOL) 325 MG tablet Take 650 mg by mouth every 6 hours as needed for Pain      budesonide (ENTOCORT EC) 3 MG extended release capsule Take 2 capsules by mouth daily      Probiotic Product (PROBIOTIC ADVANCED PO) Take by mouth daily          Patient has no known allergies. Social History     Tobacco Use   Smoking Status Never Smoker   Smokeless Tobacco Never Used      (If patient a smoker, smoking cessation counseling offered)   Social History     Substance and Sexual Activity   Alcohol Use No       REVIEW OF SYSTEMS:  Constitutional: negative  Eyes: negative  Respiratory: negative  Cardiovascular: negative  Gastrointestinal: negative  Genitourinary: see HPI  Musculoskeletal: negative  Skin: negative   Neurological: negative  Hematological/Lymphatic: negative  Psychological: negative        Physical Exam:    This a 53 y.o. male  Vitals:    12/08/20 1537   Temp: 97.1 °F (36.2 °C)     Body mass index is 21.56 kg/m². Constitutional: Patient in no acute distress; Neuro: alert and oriented to person place and time. Psych: Mood and affect normal.  Skin: warm, dry  Lungs: Respiratory effort normal, Symmetric chest rise  Cardiovascular:  Normal peripheral pulses; Regular rate, radial pulses palpable  Abdomen: Soft, non-tender, non-distended with no CVA, flank pain, hepatosplenomegaly or hernia. Kidneys normal.  Bladder non-tender and not distended. Lymphatics: no palpable lymphadenopathy  Minimal/no edema in bilateral lower extremities        Assessment and Plan        1. Perineal pain    2. Urgency of urination               Plan:        Prostatitis- was started on Cipro for 10 days. Minimal improvement. Will start Doxy 100mg for 3 weeks. Being treated for possible Crohns at this time. Prescriptions Ordered:  No orders of the defined types were placed in this encounter.      Orders Placed:  Orders Placed This Encounter   Procedures    POCT Urinalysis No Micro (Auto)    poct post void residual     Bladder scan            NO [address] [place] [address] Javier Rodríguez MD

## 2021-01-05 ENCOUNTER — TELEPHONE (OUTPATIENT)
Dept: FAMILY MEDICINE CLINIC | Age: 51
End: 2021-01-05

## 2021-01-05 NOTE — TELEPHONE ENCOUNTER
Corey Good for oral benadryl for any swelling or itching and topical vaseline to dry areas. Appt if not better.   CG

## 2021-01-05 NOTE — TELEPHONE ENCOUNTER
C/O skin irritation and redness on both upper eyelids, neck and face that started yesterday. Areas feel dry, very little itching. Very minimal swelling of the eyelids. Pt wants to know if there is anything he can use topically for the symptoms or if he can take Benadryl. Pt did use a new body wash a few days ago but doesn't think he used it on his face. Call pt back at [phone], OK to leave detailed message.

## 2021-01-13 ENCOUNTER — TELEMEDICINE (OUTPATIENT)
Dept: FAMILY MEDICINE CLINIC | Age: 51
End: 2021-01-13
Payer: COMMERCIAL

## 2021-01-13 ENCOUNTER — TELEPHONE (OUTPATIENT)
Dept: UROLOGY | Age: 51
End: 2021-01-13

## 2021-01-13 DIAGNOSIS — L20.9 ATOPIC DERMATITIS, UNSPECIFIED TYPE: Primary | ICD-10-CM

## 2021-01-13 PROCEDURE — 99213 OFFICE O/P EST LOW 20 MIN: CPT | Performed by: NURSE PRACTITIONER

## 2021-01-13 PROCEDURE — G8427 DOCREV CUR MEDS BY ELIG CLIN: HCPCS | Performed by: NURSE PRACTITIONER

## 2021-01-13 RX ORDER — PREDNISONE 20 MG/1
20 TABLET ORAL 2 TIMES DAILY
Qty: 10 TABLET | Refills: 0 | Status: SHIPPED | OUTPATIENT
Start: 2021-01-13 | End: 2021-01-18

## 2021-01-13 ASSESSMENT — ENCOUNTER SYMPTOMS
SHORTNESS OF BREATH: 0
ABDOMINAL PAIN: 0
EYES NEGATIVE: 1
BLOOD IN STOOL: 0
CHEST TIGHTNESS: 0

## 2021-01-13 NOTE — PATIENT INSTRUCTIONS
Patient Education        Atopic Dermatitis: Care Instructions  Your Care Instructions  Atopic dermatitis (also called eczema) is a skin problem that causes intense itching and a red, raised rash. In severe cases, the rash develops clear fluid-filled blisters. The rash is not contagious. People with this condition seem to have very sensitive immune systems that are likely to react to things that cause allergies. The immune system is the body's way of fighting infection. There is no cure for atopic dermatitis, but you may be able to control it with care at home. Follow-up care is a key part of your treatment and safety. Be sure to make and go to all appointments, and call your doctor if you are having problems. It's also a good idea to know your test results and keep a list of the medicines you take. How can you care for yourself at home? · Use moisturizer at least twice a day. · If your doctor prescribes a cream, use it as directed. If your doctor prescribes other medicine, take it exactly as directed. · Wash the affected area with water only. Soap can make dryness and itching worse. Pat dry. · Apply a moisturizer after bathing. Use a cream such as Lubriderm, Moisturel, or Cetaphil that does not irritate the skin or cause a rash. Apply the cream while your skin is still damp after lightly drying with a towel. · Use cold, wet cloths to reduce itching. · Keep cool, and stay out of the sun. · If itching affects your normal activities, an over-the-counter antihistamine, such as diphenhydramine (Benadryl) or loratadine (Claritin) may help. Read and follow all instructions on the label. When should you call for help? Call your doctor now or seek immediate medical care if:    · Your rash gets worse and you have a fever.     · You have new blisters or bruises, or the rash spreads and looks like a sunburn.     · You have signs of infection, such as:  ? Increased pain, swelling, warmth, or redness. ?  Red streaks leading from the rash. ? Pus draining from the rash. ? A fever.     · You have crusting or oozing sores.     · You have joint aches or body aches along with your rash. Watch closely for changes in your health, and be sure to contact your doctor if:    · Your rash does not clear up after 2 to 3 weeks of home treatment.     · Itching interferes with your sleep or daily activities. Where can you learn more? Go to [email or web address]. org and sign in to your [facility] [facility] account. Enter N456 in the [employer] box to learn more about \"Atopic Dermatitis: Care Instructions. \"     If you do not have an account, please click on the \"Sign Up Now\" link. Current as of: July 2, 2020               Content Version: 12.6  © 5999-9959 [employer]. Care instructions adapted under license by [facility] ([facility]). If you have questions about a medical condition or this instruction, always ask your healthcare professional. [address] [place] [address] any warranty or liability for your use of this information.

## 2021-01-13 NOTE — TELEPHONE ENCOUNTER
Altagracia Márquez Message was left for this patient to move his appt on the 21st to 10:45a instead of 8:30a that same day. He left a VM yesterday stating that yes he could move his appt to 10:45a. I moved it there but now you are double booked. Should I move him to 11:15a or 11:30a. Or move to a different day?  Please advise

## 2021-01-13 NOTE — PROGRESS NOTES
chest tightness and shortness of breath. Cardiovascular: Negative for chest pain, palpitations and leg swelling. Gastrointestinal: Negative for abdominal pain and blood in stool. Genitourinary: Negative for dysuria. Musculoskeletal: Negative for joint swelling. Skin: Positive for rash (face and neck). Neurological: Negative for dizziness. Psychiatric/Behavioral: Negative. All other systems reviewed and are negative. OBJECTIVE     Due to this being a TeleHealth encounter, evaluation of the following organ systems is limited: Vitals/Constitutional/EENT/Resp/CV/GI//MS/Neuro/Skin/Heme-Lymph-Imm. PHYSICAL EXAMINATION:  [ INSTRUCTIONS:  \"[x]\" Indicates a positive item  \"[]\" Indicates a negative item  -- DELETE ALL ITEMS NOT EXAMINED]  Vital Signs: (All Vital Signs are pt reported, unless otherwise noted)   There were no vitals taken for this visit. Constitutional: [x] Appears well-developed and well-nourished [x] No apparent distress      [] Abnormal-   Mental status  [x] Alert and awake  [x] Oriented to person/place/time [x]Able to follow commands      Eyes:  EOM    [x]  Normal  [] Abnormal-  Sclera  [x]  Normal  [] Abnormal -         Discharge [x]  None visible  [] Abnormal -    HENT:   [x] Normocephalic, atraumatic.   [] Abnormal   [x] Mouth/Throat: Mucous membranes are moist.     External Ears [x] Normal  [] Abnormal-     Neck: [x] No visualized mass     Pulmonary/Chest: [x] Respiratory effort normal.  [x] No visualized signs of difficulty breathing or respiratory distress        [] Abnormal-      Musculoskeletal:   [x] Normal gait with no signs of ataxia         [x] Normal range of motion of neck        [] Abnormal-       Neurological:        [x] No Facial Asymmetry (Cranial nerve 7 motor function) (limited exam to video visit)          [x] No gaze palsy        [] Abnormal-         Skin:        [] No significant exanthematous lesions or discoloration noted on facial skin         [x] Abnormal- moderate erythema to face, eyelids, and right side of neck. Psychiatric:       [x] Normal Affect [x] No Hallucinations        [] Abnormal-       Media Information       Document Information    Misc Clinical:  Clinical Unknown   Image captured from video on 1/13/2021 3:48 PM   01/13/2021 15:48   Attached To:    Telemedicine on 1/13/21 with VERN Livingston CNP   Source Information    VERN Livingston CNP  [facility]         No results found for: LABA1C  No results found for: EAG    Lab Results   Component Value Date    CHOL 134 08/13/2020    TRIG 86 08/13/2020    HDL 37 08/13/2020    LDLCALC 80 08/13/2020       Lab Results   Component Value Date     08/13/2020    K 3.8 08/13/2020     08/13/2020    CO2 26 08/13/2020    BUN 14 08/13/2020    CREATININE 1.0 08/13/2020    GLUCOSE 98 08/13/2020    CALCIUM 9.0 08/13/2020    PROT 6.9 08/13/2020    LABALBU 3.5 08/13/2020    BILITOT 0.8 08/13/2020    ALKPHOS 100 08/13/2020    AST 18 08/13/2020    ALT 11 08/13/2020    LABGLOM 80 (A) 08/13/2020       No results found for: Sigmund Precise    Lab Results   Component Value Date    TSH 1.930 08/13/2020    T4FREE 1.66 08/13/2020       Lab Results   Component Value Date    WBC 7.4 08/13/2020    HGB 15.4 08/13/2020    HCT 46.2 08/13/2020    MCV 87.2 08/13/2020     08/13/2020       No results found for: PSA, PSADIA      Immunization History   Administered Date(s) Administered    Hepatitis A Ped/Adol (Havrix, Vaqta) 04/10/2009, 12/30/2009    Influenza, Quadv, IM, PF (6 mo and older Fluzone, Flulaval, Fluarix, and 3 yrs and older Afluria) 10/02/2020    Tdap (Boostrix, Adacel) 04/20/2010    Typhoid Vac, Parenteral, Other Than Acetone-killed, Dried 12/30/2009    Yellow Fever (YF-Vax) 04/10/2009       Health Maintenance   Topic Date Due    Hepatitis C screen  1970    HIV screen  11/25/1986    DTaP/Tdap/Td vaccine (2 - Td) 04/20/2020    Lipid screen  08/13/2025  Flu vaccine  Completed    Hepatitis A vaccine  Aged Out    Hepatitis B vaccine  Aged Out    Hib vaccine  Aged Out    Meningococcal (ACWY) vaccine  Aged Out    Pneumococcal 0-64 years Vaccine  Aged Out         Future Appointments   Date Time Provider Abdon Farida   1/21/2021 10:45 AM BHAVANA Corbin 4724       ASSESSMENT       Diagnosis Orders   1. Atopic dermatitis, unspecified type  predniSONE (DELTASONE) 20 MG tablet       PLAN     Okay to use 1% hydrocortisone cream to rash bid x1 week  Prednisone sent to pharmacy  Follow up as needed  19}    Pursuant to the emergency declaration under the [facility], [facility] waiver authority and the [employer] Act, this Virtual  Visit was conducted, with patient's consent, to reduce the patient's risk of exposure to COVID-19 and provide continuity of care for an established patient. Services were provided through a video synchronous discussion virtually to substitute for in-person clinic visit. Electronically signed by VERN Kellogg CNP on 1/13/2021 at 3:59 PM    Greater than 20 minutes was spent in contact with patient with greater than 50% in counseling and coordination of care.

## 2021-01-14 NOTE — TELEPHONE ENCOUNTER
Please leave him at 10:45. Please see if the other patient at 10:45 would be willing to move to 11:30.

## 2021-01-21 ENCOUNTER — OFFICE VISIT (OUTPATIENT)
Dept: UROLOGY | Age: 51
End: 2021-01-21
Payer: COMMERCIAL

## 2021-01-21 VITALS — HEIGHT: 72 IN | WEIGHT: 166 LBS | TEMPERATURE: 97.3 F | BODY MASS INDEX: 22.48 KG/M2

## 2021-01-21 DIAGNOSIS — R31.29 MICROSCOPIC HEMATURIA: ICD-10-CM

## 2021-01-21 DIAGNOSIS — R10.2 PERINEAL PAIN: Primary | ICD-10-CM

## 2021-01-21 LAB
BILIRUBIN URINE: NEGATIVE
BLOOD URINE, POC: ABNORMAL
CHARACTER, URINE: CLEAR
COLOR, URINE: YELLOW
GLUCOSE URINE: NEGATIVE MG/DL
KETONES, URINE: NEGATIVE
LEUKOCYTE CLUMPS, URINE: NEGATIVE
NITRITE, URINE: NEGATIVE
PH, URINE: 7.5 (ref 5–9)
PROTEIN, URINE: NEGATIVE MG/DL
SPECIFIC GRAVITY, URINE: 1.02 (ref 1–1.03)
UROBILINOGEN, URINE: 0.2 EU/DL (ref 0–1)

## 2021-01-21 PROCEDURE — 99213 OFFICE O/P EST LOW 20 MIN: CPT | Performed by: PHYSICIAN ASSISTANT

## 2021-01-21 PROCEDURE — G8420 CALC BMI NORM PARAMETERS: HCPCS | Performed by: PHYSICIAN ASSISTANT

## 2021-01-21 PROCEDURE — 1036F TOBACCO NON-USER: CPT | Performed by: PHYSICIAN ASSISTANT

## 2021-01-21 PROCEDURE — 81003 URINALYSIS AUTO W/O SCOPE: CPT | Performed by: PHYSICIAN ASSISTANT

## 2021-01-21 PROCEDURE — G8482 FLU IMMUNIZE ORDER/ADMIN: HCPCS | Performed by: PHYSICIAN ASSISTANT

## 2021-01-21 PROCEDURE — G8427 DOCREV CUR MEDS BY ELIG CLIN: HCPCS | Performed by: PHYSICIAN ASSISTANT

## 2021-01-21 RX ORDER — DOXYCYCLINE HYCLATE 100 MG
100 TABLET ORAL 2 TIMES DAILY
Qty: 42 TABLET | Refills: 0 | Status: SHIPPED | OUTPATIENT
Start: 2021-01-21 | End: 2021-02-11

## 2021-01-21 NOTE — PROGRESS NOTES
Mr. Javier Mckeon is a 30-year-old male with a history of perineal pain with radiation toward the anal area that worsened with prolonged sitting or standing. He denies trauma to the area. He presented to our office in December 2020 and his initial evaluation was performed by Dr. Yessica Colbert. At the time of this evaluation, he had completed a ten day course of Cipro and only noted minimal improvement. He was then started on a three week course of Doxycycline by Dr. Yessica Colbert for presumed prostatitis. Currently, he states that he has minimal perineal discomfort and pressure but still does not feel comfortable. He denies lesions in the area. He states that the burning pain has improved with Neurontin, which he continues. He is concerned that there may be a neurological/musculoskeletal component to his pain because he really has not experienced any irritative or obstructive symptomatology with this pain. He is taking Flomax and feels he is emptying well. He does report that he had been experiencing left sacral area pain a few months before his perineal pain began. He denies dyschezia but does have a slight pressure sensation with his bowel movements. He denies any general medical complaints. Past Medical History:   Diagnosis Date    Bowel trouble        Past Surgical History:   Procedure Laterality Date    COLONOSCOPY  2020       Current Outpatient Medications on File Prior to Visit   Medication Sig Dispense Refill    ibuprofen (ADVIL;MOTRIN) 200 MG CAPS       tamsulosin (FLOMAX) 0.4 MG capsule Take 1 capsule by mouth daily 30 capsule 11    gabapentin (NEURONTIN) 300 MG capsule Take 1 capsule by mouth nightly for 180 days. Intended supply: 30 days 30 capsule 5    acetaminophen (TYLENOL) 325 MG tablet Take 650 mg by mouth every 6 hours as needed for Pain      Probiotic Product (PROBIOTIC ADVANCED PO) Take by mouth daily        No current facility-administered medications on file prior to visit.         No Known Allergies    Family History   Problem Relation Age of Onset    Asthma Mother     COPD Mother     Other Father         aortic aneurysm    Stroke Paternal Grandfather        Social History     Socioeconomic History    Marital status:      Spouse name: Not on file    Number of children: Not on file    Years of education: Not on file    Highest education level: Not on file   Occupational History    Not on file   Social Needs    Financial resource strain: Not hard at all   [employer] insecurity     Worry: Never true     Inability: Never true   [employer] needs     Medical: No     Non-medical: No   Tobacco Use    Smoking status: Never Smoker    Smokeless tobacco: Never Used   Substance and Sexual Activity    Alcohol use: No    Drug use: No    Sexual activity: Yes     Partners: Female   Lifestyle    Physical activity     Days per week: Not on file     Minutes per session: Not on file    Stress: Not on file   Relationships    Social connections     Talks on phone: Not on file     Gets together: Not on file     Attends [employer] service: Not on file     Active member of club or organization: Not on file     Attends meetings of clubs or organizations: Not on file     Relationship status: Not on file    Intimate partner violence     Fear of current or ex partner: Not on file     Emotionally abused: Not on file     Physically abused: Not on file     Forced sexual activity: Not on file   Other Topics Concern    Not on file   Social History Narrative    Not on file       Review of Systems  No problems with ears, nose or throat. No problems with eyes. No chest pain, shortness of breath, abdominal pain, extremity pain or weakness, and no neurological deficits. No rashes. No swollen glands or lymph nodes.  symptoms per HPI. The remainder of the review of symptoms is negative.     Exam    Temp 97.3 °F (36.3 °C)   Ht 6' (1.829 m)   Wt 166 lb (75.3 kg)   BMI 22.51 kg/m²     Constitutional: has not completely resolved. Will start an additional three weeks of Doxycycline to complete six weeks of therapy for prostatitis. Concerned that there may be a sacral nerve irritation component to his symptomatology. Consider Medrol Dose Pack. On physical exam, his pelvis seems tipped to the left, which may be causing strain. Will refer to [facility] for pelvic pain evaluation. Two month follow-up appointment. Call immediately if irritative and obstructive symptomatology occurs, fever/chills, gross hematuria, etc.    2. Microscopic hematuria- Urine cytology sent. No evidence of urinary infection.

## 2021-01-22 DIAGNOSIS — M54.50 CHRONIC BILATERAL LOW BACK PAIN, UNSPECIFIED WHETHER SCIATICA PRESENT: Primary | ICD-10-CM

## 2021-01-22 DIAGNOSIS — G89.29 CHRONIC BILATERAL LOW BACK PAIN, UNSPECIFIED WHETHER SCIATICA PRESENT: Primary | ICD-10-CM

## 2021-01-25 ENCOUNTER — TELEPHONE (OUTPATIENT)
Dept: UROLOGY | Age: 51
End: 2021-01-25

## 2021-01-25 DIAGNOSIS — T78.40XA ALLERGIC REACTION, INITIAL ENCOUNTER: Primary | ICD-10-CM

## 2021-01-25 RX ORDER — METHYLPREDNISOLONE 4 MG/1
TABLET ORAL
Qty: 1 KIT | Refills: 0 | Status: SHIPPED | OUTPATIENT
Start: 2021-01-25 | End: 2021-01-31

## 2021-01-25 NOTE — TELEPHONE ENCOUNTER
Patient stopped the doxycycline on Saturday. He broke out in a rash on his face and neck and his eyes started to swell. His PCP started him on prednisone for 5 days on 01/13/2020. He was on doxycycline at that time also. Doxycycline was added to the allergy list.    I can leave a voicemail. Does he need a different antibiotic or more prednisone? Please advise. Thank you.

## 2021-01-25 NOTE — TELEPHONE ENCOUNTER
Attempted to call the patient. Voicemail left stating to hold antibiotics, start Medrol Dose Pack and take zyrtec 10 mg OTC daily.

## 2021-01-25 NOTE — TELEPHONE ENCOUNTER
We will hold any new antibiotics at this time until he starts to recover from his reaction. Will start Medrol Dose Silvestre. Please have patient take Zyrtec OTC 10 mg daily.

## 2021-01-27 ENCOUNTER — HOSPITAL ENCOUNTER (OUTPATIENT)
Dept: PHYSICAL THERAPY | Age: 51
Setting detail: THERAPIES SERIES
Discharge: HOME | End: 2021-01-27
Payer: COMMERCIAL

## 2021-01-27 PROCEDURE — 97162 PT EVAL MOD COMPLEX 30 MIN: CPT | Performed by: PHYSICAL THERAPIST

## 2021-01-27 PROCEDURE — 97530 THERAPEUTIC ACTIVITIES: CPT | Performed by: PHYSICAL THERAPIST

## 2021-01-27 NOTE — PROGRESS NOTES
** PLEASE SIGN, DATE AND TIME CERTIFICATION BELOW AND RETURN TO [facility] OUTPATIENT REHABILITATION (FAX #: [phone]). ATTEST/CO-SIGN IF ACCESSING VIA [employer]. THANK YOU.**    I certify that I have examined the patient below and determined that Physical Medicine and Rehabilitation service is necessary and that I approve the established plan of care for up to 90 days or as specifically noted.   Attestation, signature or co-signature of physician indicates approval of certification requirements.    ________________________ ____________ __________  Physician Signature   Date   Time  [facility]  PHYSICAL THERAPY  OUTPATIENT REHABILITATION - SPECIALIZED THERAPY SERVICES  [x] PELVIC HEALTH EVALUATION  [] DAILY NOTE  [] PROGRESS NOTE [] DISCHARGE NOTE    Date: 2021  Patient Name:  Roberth Spivey  : 1970  MRN: [id number]  CSN: [id number]    Referring Practitioner ALYSSIA Meraz*   Diagnosis Low back pain [M54.5]  Other chronic pain [G89.29]    Treatment Diagnosis M62.89 - Other Specified Disorders of Muscle  M54.5 - Low Back Pain  M62.830 - Muscle Spasms of Back, R10.2 - Pelvic and Perineal Pain and R29.3 - Abnormal Posture   L hip pain   Date of Evaluation 21    Additional Pertinent History none      Functional Outcome Measure Used Back Index   Functional Outcome Score 14/50 (21)       Insurance: Primary: Payor: Major Agosto [id number] /  /  / ,   Secondary:    Authorization Information: No pre cert   Visit # 1,  for progress note   Visits Allowed: No visit limit   Recertification Date:    Physician Follow-Up:    Physician Orders: eval and treat   History of Present Illness: L LBP since approx 2020 with onset of L perineal pain  2020; Gabapentin, Flomax, antibiotic combo have been helpful with the perineal pain; most limited with standing and with sitting SUBJECTIVE: Pt is 52 yo male with c/c L LB aching pain along with pelvic pain. Onset was anushka noticeable in Sept 2020 with pt treated for prostatitis at that time with Cipro by PCP which may have been slightly helpful in his opinion. Pt was later treated with Gabapentin and Flomax which have been partly helpful anushka with pain in am upon awakening. Pt most recently was on an antibiotic which pt discovered he was allergic to; pt is currently on Medrol dose pack. Pt thinks the LBP came on about 1 year ago, but with prolonged standing the pain can spread across the entire LB and into the L groin; pt unsure if his perineal/pelvic pain is related to his LBP. Pt's pain locations both tend to worsen with prolonged standing/end of day. Social/Functional History and Current Status:  Medications and Allergies have been reviewed and are listed on Medical History Questionnaire. Kayleigh Gutierrez lives with spouse and with family in a multiple floor home with stairs and no handrail to enter. 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 Occupation:  at [employer].        PAIN    Location L LBP   Description Ache, has had a hx of shooting nerve pain   Increased by Prolonged standing, end of day, prolonged walking   Decreased by Lying down   Maximum Intensity 5/10   Best Intensity 1-2/10   Todays Rating 1-2/10   Other/Function Feels like it needs to stretch       PAIN    Location L perineal; deep to the L scrotum   Description Almost burning, dull ache, tight Increased by Prolonged sitting, initial physical mov't in the am   Decreased by Avoiding sitting, lying down, tries to sit on soft surfaces   Maximum Intensity 3-4/10   Best Intensity 0-1/10   Todays Rating 1/10   Other/Function        BLADDER ASSESSMENT [] Deferred secondary to:   Daily Fluid Ingestion: 6-8c water   Urination Frequency Times/Day: every 4 hrs or so  Times/Night: 1   Volume Medium   Urge Sensation Normal    SYMPTOM QUESTIONNAIRE   Loses Urine Upon: none   Incontinence Volume: None   Frequency of Leakage: none   Wets the Bed: no   Burning/Pain with Urination: no   Difficulty Starting a Stream of Urine: no   Incomplete Emptying No   Strain to Empty Bladder: no   Falling Out Feeling: no   Urinate more than 7 times/day: no   Use a form of Leakage Protection: no   Restrict Fluid Intake: no   Stream Strength Average       BOWEL ASSESSMENT [] Deferred secondary to:   Frequency: 1   Most Common Stool Consistency: nl   SYMPTOM QUESTIONNAIRE   Strain to have a BM: no     SEXUAL ASSESSMENT [] Deferred secondary to:   Sexually Active Yes, denies ED   Pain with [place] (Dyspareunia) No pain reported     VITALS [] Deferred secondary to:   Height 6'0\"   Weight 165#     GENERAL ASSESSMENT   [] Deferred secondary to:   Palpation No pain to palp L LB , but pt describes pain at low lumbar/SIJ region; pt is tight to palp L iliacus and ant pelvic girdle but only min pain to pt   Observation    Posture Increased Thoracic Kyphosis and R shoulder dep   Range of Motion Lumbar flexion 25% dec due to tightness, mild increased L LBP with flex and ext; remaining lumbar AROM WFL and without c/o;  B LE AROM WFL and without c/o   Strength B LE strength WNL, pt can \"feel it\" at his L LB with all L LE MMT and with abdominal MMT; abdom strength 3-/5   Gait WNL   Sensation WNL   Edema WNL   Balance/Fall History Denies balance or fall problems Special Tests Neg Homans, tight hams B at 50 degree SLR, mild pos L SLR at end ROM; tight piri B, L hip ER is tight at 45 degrees, R hip IR is very tight at 15 degrees, Neg FADIR, R GUNNAR causes some R hip pain, L GUNNAR is 50% more tight than R, Neg Scours, Neg Sanchez, Neg SIJ distraction,            OBSERVATION  [] Deferred secondary to:   Patient Safety Patient offered a chaperone and declined.    Skin Condition intact   Urogenital Triangle No tenderness or tightness reported       PELVIC FLOOR EXTERNAL EXAM [] Deferred secondary to:   Exam perineal body   Sensation Intact   Muscle Localization Good - minus after instruction and cues to decrease effort   Palpation/Tone Normal   Pelvic Floor Strength PERF:Power: 2,Endurance: 3,Reps: 5,Flicks: 10   Relax after Contraction Normal       TREATMENT   Precautions:     X in shaded column indicates activity completed today   Modalities Parameters/  Location  Notes                     Manual Therapy Time/Technique  Notes                     Exercise/Intervention    Notes   Basic pelvic anatomy and nature of condition; instruction on PFM localization 10 min  x    Avoid LE asymmetry 5 min  x    Elevator analogy of normalizing PFM tone 5 min  x    kegel-quick 1 sec 10 x Qd to bid, sit or lie   kegel-hold 3 sec 10 x Qd to bid   LTR 3 sec 10 x bid   Supine adductor stretch 30 sec 3 x bid   Donnie stretch 1 min  x bid   piri stretch       Hams stretch       [facility]                                            Specific Interventions Next Treatment: review current instructions and progress home program as approp to symptoms; initiate manual therapy of L iliacus, hip girdle, and LB    Activity/Treatment Tolerance:  [x]  Patient tolerated treatment well  []  Patient limited by fatigue  []  Patient limited by pain   []  Patient limited by medical complications  []  Other:     Patient Education: Long Term Goals:  Time Frame: 12 weeks  *  Patient to be independent with progressed HEP. * Patient's Back Index Score will improve to 7/50 or less to demonstrate functional improvement in condition. * Decrease max intensity of LBP to 2/10 and present <20% of the day. * Decrease max intensity of perineal pain to 1/10 and present <20% of the day. * Pt to report overall 90% improvement in sitting tolerance due to reduction of pelvic pain. * Pt to report overall 80% improvement in standing tolerance due to reduction of LBP. * Pt's hip rotation PROM to be equal B to allow for mm symmetry and reduction of pelvic and LB tensions. * Improve pt's hams flexibility with SLR to 70 degrees or greater to reduce LB and pelvic strain. PLAN:  Treatment Recommendations: Strengthening, Range of Motion, Functional Mobility Training, Neuromuscular Re-education, Manual Therapy - Soft Tissue Mobilization, Pain Management, Home Exercise Program, Patient Education, Self-Care Education and Training, Positioning and Integrative Dry Needling    [x]  Plan of care initiated. Plan to see patient 1 times per week for 12 weeks to address the treatment planned outlined above.   []  Continue with current plan of care  []  Modify plan of care as follows:    []  Hold pending physician visit  []  Discharge    Time In 7:55   Time Out 9:00   Timed Code Minutes: 35 min   Total Treatment Time: 65 min       Electronically Signed by: STEWART SHAFFER

## 2021-02-01 ENCOUNTER — HOSPITAL ENCOUNTER (OUTPATIENT)
Dept: PHYSICAL THERAPY | Age: 51
Setting detail: THERAPIES SERIES
Discharge: HOME | End: 2021-02-01
Payer: COMMERCIAL

## 2021-02-01 PROCEDURE — 97140 MANUAL THERAPY 1/> REGIONS: CPT | Performed by: PHYSICAL THERAPIST

## 2021-02-01 PROCEDURE — 97110 THERAPEUTIC EXERCISES: CPT | Performed by: PHYSICAL THERAPIST

## 2021-02-01 NOTE — PROGRESS NOTES
[facility]  PHYSICAL THERAPY  OUTPATIENT REHABILITATION - SPECIALIZED THERAPY SERVICES  [] PELVIC HEALTH EVALUATION  [x] DAILY NOTE  [] PROGRESS NOTE [] DISCHARGE NOTE    Date: 2021  Patient Name:  Casandra Valera  : 1970  MRN: [id number]  CSN: [id number]    Referring Practitioner JOMAR Christiansen   Diagnosis Low back pain [M54.5]  Other chronic pain [G89.29]    Treatment Diagnosis M62.89 - Other Specified Disorders of Muscle  M54.5 - Low Back Pain  M62.830 - Muscle Spasms of Back, R10.2 - Pelvic and Perineal Pain and R29.3 - Abnormal Posture   L hip pain   Date of Evaluation 21    Additional Pertinent History none      Functional Outcome Measure Used Back Index   Functional Outcome Score 14/50 (21)       Insurance: Primary: Payor: Tessy Camejo /  /  / ,   Secondary:    Authorization Information: No pre cert   Visit # 2,  for progress note   Visits Allowed: No visit limit   Recertification Date:    Physician Follow-Up:    Physician Orders: eval and treat   History of Present Illness: L LBP since approx 2020 with onset of L perineal pain  2020; Gabapentin, Flomax, antibiotic combo have been helpful with the perineal pain; most limited with standing and with sitting     SUBJECTIVE: Pt working with home PT instructions to this point. He suspects the stretches vs the kegels slightly aggravate his pain a bit, like a nerve irritation feeling. Pain still all L sided. Social/Functional History and Current Status:  Medications and Allergies have been reviewed and are listed on Medical History Questionnaire. Pt taking Gabapentin and Flomax    Casandra Valera lives with spouse and with family in a multiple floor home with stairs and no handrail to enter.     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 Occupation:  at [email or web address]. PAIN    Location L LBP   Description Ache, has had a hx of shooting nerve pain   Increased by Prolonged standing, end of day, prolonged walking   Decreased by Lying down   Maximum Intensity 5/10   Best Intensity 1-2/10   Todays Rating 1-2/10   Other/Function Feels like it needs to stretch       PAIN    Location L perineal; deep to the L scrotum   Description Almost burning, dull ache, tight   Increased by Prolonged sitting, initial physical mov't in the am   Decreased by Avoiding sitting, lying down, tries to sit on soft surfaces   Maximum Intensity 3-4/10   Best Intensity 0-1/10   Todays Rating 1/10   Other/Function        GENERAL ASSESSMENT   [] Deferred secondary to:   Palpation No pain to palp L LB , but pt describes pain at low lumbar/SIJ region; pt is tight to palp L iliacus and ant pelvic girdle but only min pain to pt   Observation    Posture Increased Thoracic Kyphosis and R shoulder dep   Range of Motion Lumbar flexion 25% dec due to tightness, mild increased L LBP with flex and ext; remaining lumbar AROM WFL and without c/o;  B LE AROM WFL and without c/o   Strength B LE strength WNL, pt can \"feel it\" at his L LB with all L LE MMT and with abdominal MMT; abdom strength 3-/5   Gait WNL   Sensation WNL   Edema WNL   Balance/Fall History Denies balance or fall problems Special Tests Neg Homans, tight hams B at 50 degree SLR, mild pos L SLR at end ROM; tight piri B, L hip ER is tight at 45 degrees, R hip IR is very tight at 15 degrees, Neg FADIR, R GUNNAR causes some R hip pain, L GUNNAR is 50% more tight than R, Neg Scours, Neg Sanchez, Neg SIJ distraction,            OBSERVATION  [] Deferred secondary to:   Patient Safety Patient offered a chaperone and declined.    Skin Condition intact   Urogenital Triangle No tenderness or tightness reported       PELVIC FLOOR EXTERNAL EXAM [] Deferred secondary to:   Exam perineal body   Sensation Intact   Muscle Localization Good - minus after instruction and cues to decrease effort   Palpation/Tone Normal   Pelvic Floor Strength PERF:Power: 2,Endurance: 3,Reps: 5,Flicks: 10   Relax after Contraction Normal       TREATMENT   Precautions:     X in shaded column indicates activity completed today   Manual Therapy Time/Technique  Notes   L iliacus and inguinal region MFR-hooklying 10 min x    Prone L SIJ region CTM 15 min x    Manual MET for L pelvic outflare 5 min x    Exercise/Intervention    Notes   Basic pelvic anatomy and nature of condition; instruction on PFM localization 10 min  x    Avoid LE asymmetry 5 min  x    Elevator analogy of normalizing PFM tone 5 min  x    kegel-quick 1 sec 10 x Qd to bid, sit or lie   kegel-hold 3 sec 10 x Qd to bid   LTR 3 sec 10 x bid   Supine adductor stretch 30 sec 3     Donnie stretch 1 min  x bid   MET L quad with R hams 5 sec 5 x    hooklying ball squeeze 5 sec 5 x    piri stretch       Hams stretch       [facility]              Instructed self use of tiger tail or balls for pressure points at L SIJ 5 min  x                           Specific Interventions Next Treatment: review current instructions and progress home program as approp to symptoms; continue manual therapy as approp to symptoms    Activity/Treatment Tolerance:  [x]  Patient tolerated treatment well  []  Patient limited by fatigue []  Patient limited by pain   []  Patient limited by medical complications  []  Other:     Patient Education:   [x]  HEP/Education Completed: Instructed pt to stop the adductor stretch as pt has some L LBP during this activity. Instructed MET f/b hooklying ball squeeze to improve pelvic position with HO given. Initiated manual therapy of MET for L pelvic outflare, MFR of L iliacus and inguinal region, CTM of L SIJ. Instructed self techniques at home using ball or tiger tail for SIJ release. []  No new Education completed  [x]  Reviewed Prior HEP      [x]  Patient verbalized and/or demonstrated understanding of education provided. []  Patient unable to verbalize and/or demonstrate understanding of education provided. Will continue education. [x]  Barriers to learning: none    Assessment: Pt is tender and tight at L SIJ region and distal lumbar para's. Pt with only mild tightness and tenderness at L iliacus and inguinal region. Worked anushka today with manual tech's of the pelvic region along with ex modifications as listed to assist with symmetry, pain reduction. Body Structures/Functions/Activity Limitations: Abdominal and core weakness, Limited hip girdle flexibility, Impaired activity tolerance, Impaired muscle tone and Pain  Prognosis: Good    GOALS:  Patient Goal: abolish the pain; be able to stand and sit without pain    Short Term Goals:  Time Frame: 6 weeks  *  Patient to be independent with basic HEP. * Decrease max intensity of LBP to 3/10 and by 25% in frequency to allow increased standing tolerance. * Decrease max intensity of perineal pain to 2/10 and by 50% in frequency to allow for increased sitting tolerance. * Improve R hip IR to 20 degrees and L hip ER to 60 degrees due to reduction of mm tension and improving mm tension symmetry to reduce LB and pelvic strain. Long Term Goals:  Time Frame: 12 weeks  *  Patient to be independent with progressed HEP. * Patient's Back Index Score will improve to 7/50 or less to demonstrate functional improvement in condition. * Decrease max intensity of LBP to 2/10 and present <20% of the day. * Decrease max intensity of perineal pain to 1/10 and present <20% of the day. * Pt to report overall 90% improvement in sitting tolerance due to reduction of pelvic pain. * Pt to report overall 80% improvement in standing tolerance due to reduction of LBP. * Pt's hip rotation PROM to be equal B to allow for mm symmetry and reduction of pelvic and LB tensions. * Improve pt's hams flexibility with SLR to 70 degrees or greater to reduce LB and pelvic strain. PLAN:  Treatment Recommendations: Strengthening, Range of Motion, Functional Mobility Training, Neuromuscular Re-education, Manual Therapy - Soft Tissue Mobilization, Pain Management, Home Exercise Program, Patient Education, Self-Care Education and Training, Positioning and Integrative Dry Needling    []  Plan of care initiated. Plan to see patient 1 times per week for 12 weeks to address the treatment planned outlined above.   [x]  Continue with current plan of care  []  Modify plan of care as follows:    []  Hold pending physician visit  []  Discharge    Time In 11:10   Time Out 12:10   Timed Code Minutes: 60 min   Total Treatment Time: 60 min       Electronically Signed by: STEWART SHAFFER

## 2021-02-03 ENCOUNTER — APPOINTMENT (OUTPATIENT)
Dept: PHYSICAL THERAPY | Age: 51
End: 2021-02-03
Payer: COMMERCIAL

## 2021-02-05 ENCOUNTER — TELEPHONE (OUTPATIENT)
Dept: FAMILY MEDICINE CLINIC | Age: 51
End: 2021-02-05

## 2021-02-05 RX ORDER — METHYLPREDNISOLONE 4 MG/1
TABLET ORAL
Qty: 1 KIT | Refills: 0 | Status: SHIPPED | OUTPATIENT
Start: 2021-02-05 | End: 2021-02-11

## 2021-02-05 NOTE — TELEPHONE ENCOUNTER
Patient advised of the message and voiced understanding. Appointment time sent via my chart regarding appointment time with Dr Bishop Barrett. He will  the medrol dose pack and if the rash worsens over the weekend he will go to the urgent care to be evaluated. Patient also voiced understanding to stop the flomax and neurontin.

## 2021-02-05 NOTE — TELEPHONE ENCOUNTER
Please have patient stop his Flomax and Neurontin. 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 Does his skin look burnt or blistered? Please see if Dr. Bishop Barrett (Dermatology) has any openings to assess his dermatitis/allergic reaction in the near future, especially since he has facial involvement. Will restart Medrol Dose Pack.

## 2021-02-05 NOTE — TELEPHONE ENCOUNTER
Shireen Delgado from [address] [place] [address] Urology called today with allergic reaction to medication  Was offered an appointment for today  Patient would like anyone today

## 2021-02-05 NOTE — TELEPHONE ENCOUNTER
Ricci Lindsey at Dr Conrado Kingsley office. He will see Joanne MEADOWS. Appointment scheduled for 02/08/2021 arrive at 1000 for appointment 21 . Patient to bring ID and insurance card. Message left on the nurses line at Beatriz Connors [facility] office to see if the patient can be evaluated today. Attempted to call the patient. Left voicemail to call the office back.

## 2021-02-05 NOTE — TELEPHONE ENCOUNTER
Spoke with TS regarding this. Asked that I contact the patient to see if he desired an appointment in our office today for evaluation. She would definitely recommend that the patient f/u with derm on Monday as scheduled and restart Medrol as directed. TS not sure that she would be comfortable diagnosing any of the more complex skin issues and would rather derm do this. I spoke with the patient and he doesn't feel that an appointment in our office today is necessary. He will plan f/u with derm on Monday.

## 2021-02-11 NOTE — TELEPHONE ENCOUNTER
He seen PT a couple of times and Kasandra helped him with the pelvic area. It was sore for about a week. He thinks that caused the pelvic issue and pain. He is trying OTC motrin, tylenol, and ice. He is also going to slowly restart some exercises that PT recommended. He did see Dr Sadaf Bueno and was put on a face cream. The rash has cleared up. Dr Sadaf Bueno was not 100 % sure if it was seborrhoic dermatitis. He was given recommendations for soap and other things to use. He did finish the Medrol dose pack. He was told rash may not of been caused from the medications since the rash was not on his whole body. Do you want to wait awhile before retrying the gabapentin? Or try to restart one medication at a time. He is able to empty out ok and does not have any issues voiding. Please advise. Thank you.

## 2021-02-11 NOTE — TELEPHONE ENCOUNTER
Will you please call Mr. Lio Guy and see how he is feeling with his Flomax and Gabapentin discontinued? Is he able to empty well? Has he rash improved with the second Medrol Dose Pack? Did he see Dr. Fiorella Hurtado and what is the plan? Would you please obtain his recent office note from Dr. García Roles office and scan into the chart.

## 2021-02-12 NOTE — TELEPHONE ENCOUNTER
Received Office Note from Dr [place] office, scanned in [address], Routed to [facility]

## 2021-02-24 ENCOUNTER — HOSPITAL ENCOUNTER (OUTPATIENT)
Dept: PHYSICAL THERAPY | Age: 51
Setting detail: THERAPIES SERIES
Discharge: HOME | End: 2021-02-24
Payer: COMMERCIAL

## 2021-02-24 PROCEDURE — 97110 THERAPEUTIC EXERCISES: CPT | Performed by: PHYSICAL THERAPIST

## 2021-02-24 PROCEDURE — 97140 MANUAL THERAPY 1/> REGIONS: CPT | Performed by: PHYSICAL THERAPIST

## 2021-02-24 NOTE — PROGRESS NOTES
[facility]  PHYSICAL THERAPY  OUTPATIENT REHABILITATION - SPECIALIZED THERAPY SERVICES  [] PELVIC HEALTH EVALUATION  [x] DAILY NOTE  [] PROGRESS NOTE [] DISCHARGE NOTE    Date: 2021  Patient Name:  Hua Mckeon  : 1970  MRN: [id number]  CSN: [id number]    Referring Practitioner ALYSSIA Kelly*   Diagnosis Low back pain [M54.5]  Other chronic pain [G89.29]    Treatment Diagnosis M62.89 - Other Specified Disorders of Muscle  M54.5 - Low Back Pain  M62.830 - Muscle Spasms of Back, R10.2 - Pelvic and Perineal Pain and R29.3 - Abnormal Posture   L hip pain   Date of Evaluation 21    Additional Pertinent History none      Functional Outcome Measure Used Back Index   Functional Outcome Score 14/50 (21)       Insurance: Primary: Payor: Luba Lehman /  /  / ,   Secondary:    Authorization Information: No pre cert   Visit # 3,  for progress note   Visits Allowed: No visit limit   Recertification Date:    Physician Follow-Up:    Physician Orders: eval and treat   History of Present Illness: L LBP since approx 2020 with onset of L perineal pain  2020; Gabapentin, Flomax, antibiotic combo have been helpful with the perineal pain; most limited with standing and with sitting     SUBJECTIVE: Pt had some increased soreness after last PT treatment, pt reduced his PT ex's, and he now doing them qd rather than bid. Pt is still having some soreness along with most pain at L iliac crest region. Pt's L perineum to rectum \"nerve\" pain have been more increased since last PT appt as well. Pt states his L LBP pain is less first in am and worsens as the day goes on, anushka with prolonged standing. Sitting anushka flares the perineal pain, anushka if that pain is already irritated. Pt has been working with derm and urology regarding a face rash; pt has stopped the Gabapentin and the Flomax and the antibiotic and using a face cream.  Pt is unsure if stopping the meds have also contributed to his increased soreness. Social/Functional History and Current Status:  Medications and Allergies have been reviewed and are listed on Medical History Questionnaire. Pt taking Gabapentin and Flomax    Dana Ruelas lives with spouse and with family in a multiple floor home with stairs and no handrail to enter. Task Previous Current   ADLs  Independent Independent   IADL's Independent prolonged sitting can increase the pelvic pain, anushka if on hard chair; pt has limited standing tolerance due to the LBP   [facility] active football and basketball coaching   Community Integration Independent LBP has limited ability to golf   Driving Active  Active    Work [employer]. Occupation:  at [employer].        PAIN    Location L LBP   Description Ache, has had a hx of shooting nerve pain   Increased by Prolonged standing, end of day, prolonged walking   Decreased by Lying down   Maximum Intensity 6/10   Best Intensity 1-2/10   Todays Rating 3/10   Other/Function Feels like it needs to stretch       PAIN    Location L perineal; deep to the L scrotum   Description Almost burning, dull ache, tight   Increased by Prolonged sitting, initial physical mov't in the am   Decreased by Avoiding sitting, lying down, tries to sit on soft surfaces   Maximum Intensity 4/10   Best Intensity 0-1/10   Todays Rating 3/10   Other/Function        GENERAL ASSESSMENT   [] Deferred secondary to:   Palpation No pain to palp L LB , but pt describes pain at low lumbar/SIJ region   Observation    Posture Increased Thoracic Kyphosis and R shoulder dep Range of Motion Lumbar flexion 25% dec due to tightness, mild increased L LBP with flex and ext; remaining lumbar AROM WFL and without c/o; B LE AROM WFL and without c/o   Strength B LE strength WNL, pt can \"feel it\" at his L LB with all L LE MMT and with abdominal MMT; abdom strength 3-/5   Gait WNL   Sensation WNL   Edema WNL   Balance/Fall History Denies balance or fall problems   Special Tests Neg Homans, tight hams B at 50 degree SLR, mild pos L SLR at end ROM; tight piri B, L hip ER is tight at 45 degrees, R hip IR is very tight at 15 degrees, Neg FADIR, R GUNNAR causes some R hip pain, L GUNNAR is 50% more tight than R, Neg Scours, Neg Sanchez, Neg SIJ distraction,            OBSERVATION  [] Deferred secondary to:   Patient Safety Patient offered a chaperone and declined.    Skin Condition intact   Urogenital Triangle No tenderness or tightness reported       PELVIC FLOOR EXTERNAL EXAM [] Deferred secondary to:   Exam perineal body   Sensation Intact   Muscle Localization Good - minus after instruction and cues to decrease effort   Palpation/Tone Normal   Pelvic Floor Strength PERF:Power: 2,Endurance: 3,Reps: 5,Flicks: 10   Relax after Contraction Normal       TREATMENT   Precautions:     X in shaded column indicates activity completed today   Manual Therapy Time/Technique  Notes   Prone L>R isch tub/inf glut CTM 15 min x    Prone L SIJ region CTM 10 min x    Manual MET for L pelvic outflare 5 min x    Exercise/Intervention    Notes   Basic pelvic anatomy and nature of condition; instruction on PFM localization 2 min  x    Avoid LE asymmetry 2 min  x    Elevator analogy of normalizing PFM tone 2 min  x    kegel-quick 1 sec 10 x Qd to bid, sit or lie   kegel-hold 3 sec 10 x Qd to bid   LTR 3 sec to the R; 5 sec to the L 10 x bid   Supine adductor stretch 30 sec 3  hold   Donnie stretch 1 min   hold   MET L quad with R hams 5 sec 5 x L quad only   hooklying ball squeeze 5 sec 5 x    piri stretch Hams stretch       [facility]       DK 5 sec 5 x    Pelvic tilt       Instructed self use of tiger tail or balls for pressure points at L SIJ 5 min  x                           Specific Interventions Next Treatment: review current instructions and progress home program as approp to symptoms;instruct pelvic tilt anushka if pt's LBP cont to favor flexion based act's; continue manual therapy as approp to symptoms    Activity/Treatment Tolerance:  [x]  Patient tolerated treatment well  []  Patient limited by fatigue  []  Patient limited by pain   []  Patient limited by medical complications  []  Other:     Patient Education:   [x]  HEP/Education Completed:  Upon review, pt was doing 25 reps (5 reps of 5 SETS) of MET; clarified this to 5 reps only and reduced to only L quad/not R bridge. Cont with manual MET for outflare. Held on manual therapy of L iliacus and instead initiated CTM at L inf glut/isch tub region in addition to cont with the manual therapy of the L SIJ region. Cont with therex as listed and progressed by instructing [employer] which pt stated was very relieving of his LBP. Pt also noted to exhibit improved symmetry (due to improving flexibility) with LTR. []  No new Education completed  [x]  Reviewed Prior HEP      [x]  Patient verbalized and/or demonstrated understanding of education provided. []  Patient unable to verbalize and/or demonstrate understanding of education provided. Will continue education. [x]  Barriers to learning: none    Assessment: Pt's pelvic position is nearly normal today, so had pt do only do L quad for MET. He was sore after last PT appt, and he req'd clarification on parameters of home routine. Pt found [employer] to be very symptom relieving today of his L LBP. He cont to describe all of his pain as very deep, but he left the clinic today with no pain. Standing increases the L LBP while sitting increases the L perineal pain. Body Structures/Functions/Activity Limitations: Abdominal and core weakness, Limited hip girdle flexibility, Impaired activity tolerance, Impaired muscle tone and Pain  Prognosis: Good    GOALS:  Patient Goal: abolish the pain; be able to stand and sit without pain    Short Term Goals:  Time Frame: 6 weeks  *  Patient to be independent with basic HEP. * Decrease max intensity of LBP to 3/10 and by 25% in frequency to allow increased standing tolerance. * Decrease max intensity of perineal pain to 2/10 and by 50% in frequency to allow for increased sitting tolerance. * Improve R hip IR to 20 degrees and L hip ER to 60 degrees due to reduction of mm tension and improving mm tension symmetry to reduce LB and pelvic strain. Long Term Goals:  Time Frame: 12 weeks  *  Patient to be independent with progressed HEP. * Patient's Back Index Score will improve to 7/50 or less to demonstrate functional improvement in condition. * Decrease max intensity of LBP to 2/10 and present <20% of the day. * Decrease max intensity of perineal pain to 1/10 and present <20% of the day. * Pt to report overall 90% improvement in sitting tolerance due to reduction of pelvic pain. * Pt to report overall 80% improvement in standing tolerance due to reduction of LBP. * Pt's hip rotation PROM to be equal B to allow for mm symmetry and reduction of pelvic and LB tensions. * Improve pt's hams flexibility with SLR to 70 degrees or greater to reduce LB and pelvic strain. PLAN:  Treatment Recommendations: Strengthening, Range of Motion, Functional Mobility Training, Neuromuscular Re-education, Manual Therapy - Soft Tissue Mobilization, Pain Management, Home Exercise Program, Patient Education, Self-Care Education and Training, Positioning and Integrative Dry Needling    []  Plan of care initiated. Plan to see patient 1 times per week for 12 weeks to address the treatment planned outlined above.   [x]  Continue with current plan of care []  Modify plan of care as follows:    []  Hold pending physician visit  []  Discharge    Time In 4:00   Time Out 5:00   Timed Code Minutes: 60 min   Total Treatment Time: 60 min       Electronically Signed by: STEWART SHAFFER

## 2021-03-01 ENCOUNTER — HOSPITAL ENCOUNTER (OUTPATIENT)
Dept: PHYSICAL THERAPY | Age: 51
Setting detail: THERAPIES SERIES
Discharge: HOME | End: 2021-03-01
Payer: COMMERCIAL

## 2021-03-01 PROCEDURE — 97110 THERAPEUTIC EXERCISES: CPT | Performed by: PHYSICAL THERAPIST

## 2021-03-01 PROCEDURE — 97140 MANUAL THERAPY 1/> REGIONS: CPT | Performed by: PHYSICAL THERAPIST

## 2021-03-01 NOTE — PROGRESS NOTES
[id number]  PHYSICAL THERAPY  OUTPATIENT REHABILITATION - SPECIALIZED THERAPY SERVICES  [] PELVIC HEALTH EVALUATION  [x] DAILY NOTE  [] PROGRESS NOTE [] DISCHARGE NOTE    Date: 3/1/2021  Patient Name:  Mylene Huston  : 1970  MRN: [id number]  [facility]: [id number]    Referring Practitioner ALYSSIA Medina*   Diagnosis Low back pain [M54.5]  Other chronic pain [G89.29]    Treatment Diagnosis M62.89 - Other Specified Disorders of Muscle  M54.5 - Low Back Pain  M62.830 - Muscle Spasms of Back, R10.2 - Pelvic and Perineal Pain and R29.3 - Abnormal Posture   L hip pain   Date of Evaluation 21    Additional Pertinent History none      Functional Outcome Measure Used Back Index   Functional Outcome Score  (21)       Insurance: Primary: Payor: Sergey Pressley /  /  / ,   Secondary:    Authorization Information: No pre cert   Visit # 4,  for progress note   Visits Allowed: No visit limit   Recertification Date:    Physician Follow-Up: 3/18/21   Physician Orders: eval and treat   History of Present Illness: L LBP since approx 2020 with onset of L perineal pain  2020; Gabapentin, Flomax, antibiotic combo have been helpful with the perineal pain; most limited with standing and with sitting     SUBJECTIVE: Pt reports less intense pain since last PT appt, but pain is still indeed present. Pt cont to note more L LBP with standing. L perineal pain is more noticeable right after PT the rest of that day. [address] is still relieving of pt's LBP. Social/Functional History and Current Status:  Medications and Allergies have been reviewed and are listed on Medical History Questionnaire. Mylene Huston lives with spouse and with family in a multiple floor home with stairs and no handrail to enter.     Task Previous Current   ADLs  Independent Independent IADL's Independent prolonged sitting can increase the pelvic pain, anushka if on hard chair; pt has limited standing tolerance due to the LBP   Ambulation Independent [address] [place] active football and basketball coaching   Community Integration Independent LBP has limited ability to golf   Driving Active  Active    Work [employer]. Occupation:  at [employer]. PAIN    Location L LBP   Description Ache, has had a hx of shooting nerve pain   Increased by Prolonged standing, end of day, prolonged walking   Decreased by Lying down   Maximum Intensity 5/10   Best Intensity 1-2/10   Todays Rating 2-3/10   Other/Function Feels like it needs to stretch       PAIN    Location L perineal; deep to the L scrotum   Description Almost burning, dull ache, tight   Increased by Prolonged sitting, initial physical mov't in the am   Decreased by Avoiding sitting, lying down, tries to sit on soft surfaces   Maximum Intensity 4/10   Best Intensity 0-1/10   Todays Rating 2-3/10   Other/Function        GENERAL ASSESSMENT   [] Deferred secondary to:   Palpation No pain to palp L LB , but pt describes pain at low lumbar/SIJ region   Observation    Posture Increased Thoracic Kyphosis and R shoulder dep   Range of Motion Lumbar flexion 25% dec due to tightness, mild increased L LBP with flex and ext; remaining lumbar AROM WFL and without c/o;  B LE AROM WFL and without c/o   Strength B LE strength WNL, pt can \"feel it\" at his L LB with all L LE MMT and with abdominal MMT; abdom strength 3-/5   Gait WNL   Sensation WNL   Edema WNL   Balance/Fall History Denies balance or fall problems Special Tests Neg Homans, tight hams B at 50 degree SLR, mild pos L SLR at end ROM; tight piri B, L hip ER is tight at 45 degrees, R hip IR is very tight at 15 degrees, Neg FADIR, R GUNNAR causes some R hip pain, L GUNNAR is 50% more tight than R, Neg Scours, Neg Sanchez, Neg SIJ distraction,            OBSERVATION  [] Deferred secondary to:   Patient Safety Patient offered a chaperone and declined.    Skin Condition intact   Urogenital Triangle No tenderness or tightness reported       PELVIC FLOOR EXTERNAL EXAM [] Deferred secondary to:   Exam perineal body   Sensation Intact   Muscle Localization Good - minus after instruction and cues to decrease effort   Palpation/Tone Normal   Pelvic Floor Strength PERF:Power: 2,Endurance: 3,Reps: 5,Flicks: 10   Relax after Contraction Normal       TREATMENT   Precautions:     X in shaded column indicates activity completed today   Manual Therapy Time/Technique  Notes         Prone L SIJ region CTM and into L isch tub/sacrospinous ligament region 15 min x    Manual MET for L pelvic outflare 5 min x    Exercise/Intervention    Notes   Basic pelvic anatomy and nature of condition; instruction on PFM localization 2 min  x    Avoid LE asymmetry 2 min  x    Elevator analogy of normalizing PFM tone 2 min  x    kegel-quick 1 sec 10 x Qd to bid, sit or lie   kegel-hold 3 sec 10 x Qd to bid   LTR 3 sec to the R; 5 sec to the L 10 x bid   Supine adductor stretch 30 sec 3  hold   Donnie stretch 1 min   hold   MET L quad with R hams       hooklying ball squeeze 5 sec 10 x    piri stretch 20 sec 3 x    Hams stretch-LeSeague's position 20 sec 3 x    SKC       DKC 5 sec 5 x    Pelvic tilt 5 sec 10 x    Instructed self use of tiger tail or balls for pressure points at L SIJ 5 min  x Specific Interventions Next Treatment: review current instructions and progress home program as approp to symptoms;instruct 90/90 hip lift; continue manual therapy as approp to symptoms    Activity/Treatment Tolerance:  [x]  Patient tolerated treatment well  []  Patient limited by fatigue  []  Patient limited by pain   []  Patient limited by medical complications  []  Other:     Patient Education:   [x]  HEP/Education Completed:  Pelvic position today is good so stop MET at home; cont in clinic treatment for L pelvic outflare. Increase adductor squeeze to 10 reps. Cont with therex as listed and progressed by instructing piri stretch, hams stretch, and pelvic tilt. Cont with manual therapy as listed. Pt most tight at L sup-med iliac crest region and at sacrospinus ligament region. []  No new Education completed  [x]  Reviewed Prior HEP      [x]  Patient verbalized and/or demonstrated understanding of education provided. []  Patient unable to verbalize and/or demonstrate understanding of education provided. Will continue education. [x]  Barriers to learning: none    Assessment: Pt's pain over the past several days has been less intense, but he also reports a relaxing weekend in which he did not need to do prolonged standing. Pt still finds [address] to be relieving of his LBP. Pt is starting to suspect that his L LBP and his L pelvic pain do seem related. He did have a couple of temporary twinges of L perineal pain in clinic today with doing ball squeeze (adductor contraction)   Body Structures/Functions/Activity Limitations: Abdominal and core weakness, Limited hip girdle flexibility, Impaired activity tolerance, Impaired muscle tone and Pain  Prognosis: Good    GOALS:  Patient Goal: abolish the pain; be able to stand and sit without pain    Short Term Goals:  Time Frame: 6 weeks  *  Patient to be independent with basic HEP. * Decrease max intensity of LBP to 3/10 and by 25% in frequency to allow increased standing tolerance. * Decrease max intensity of perineal pain to 2/10 and by 50% in frequency to allow for increased sitting tolerance. * Improve R hip IR to 20 degrees and L hip ER to 60 degrees due to reduction of mm tension and improving mm tension symmetry to reduce LB and pelvic strain. Long Term Goals:  Time Frame: 12 weeks  *  Patient to be independent with progressed HEP. * Patient's Back Index Score will improve to 7/50 or less to demonstrate functional improvement in condition. * Decrease max intensity of LBP to 2/10 and present <20% of the day. * Decrease max intensity of perineal pain to 1/10 and present <20% of the day. * Pt to report overall 90% improvement in sitting tolerance due to reduction of pelvic pain. * Pt to report overall 80% improvement in standing tolerance due to reduction of LBP. * Pt's hip rotation PROM to be equal B to allow for mm symmetry and reduction of pelvic and LB tensions. * Improve pt's hams flexibility with SLR to 70 degrees or greater to reduce LB and pelvic strain. PLAN:  Treatment Recommendations: Strengthening, Range of Motion, Functional Mobility Training, Neuromuscular Re-education, Manual Therapy - Soft Tissue Mobilization, Pain Management, Home Exercise Program, Patient Education, Self-Care Education and Training, Positioning and Integrative Dry Needling    []  Plan of care initiated. Plan to see patient 1 times per week for 12 weeks to address the treatment planned outlined above.   [x]  Continue with current plan of care  []  Modify plan of care as follows:    []  Hold pending physician visit  []  Discharge    Time In 3:15   Time Out 4:15   Timed Code Minutes: 60 min   Total Treatment Time: 60 min       Electronically Signed by: STEWART SHAFFER

## 2021-03-06 ENCOUNTER — HOSPITAL ENCOUNTER (OUTPATIENT)
Age: 51
Discharge: HOME | End: 2021-03-06
Payer: COMMERCIAL

## 2021-03-06 LAB
ALBUMIN SERPL-MCNC: 3.7 G/DL (ref 3.5–5.1)
ALP BLD-CCNC: 88 U/L (ref 38–126)
ALT SERPL-CCNC: 11 U/L (ref 11–66)
AST SERPL-CCNC: 22 U/L (ref 5–40)
BILIRUB SERPL-MCNC: 0.6 MG/DL (ref 0.3–1.2)
BILIRUBIN DIRECT: < 0.2 MG/DL (ref 0–0.3)
ERYTHROCYTE [DISTWIDTH] IN BLOOD BY AUTOMATED COUNT: 12 % (ref 11.5–14.5)
ERYTHROCYTE [DISTWIDTH] IN BLOOD BY AUTOMATED COUNT: 39.8 FL (ref 35–45)
HCT VFR BLD CALC: 49.4 % (ref 42–52)
HEMOGLOBIN: 16.1 GM/DL (ref 14–18)
MCH RBC QN AUTO: 29.8 PG (ref 26–33)
MCHC RBC AUTO-ENTMCNC: 32.6 GM/DL (ref 32.2–35.5)
MCV RBC AUTO: 91.5 FL (ref 80–94)
PLATELET # BLD: 354 THOU/MM3 (ref 130–400)
PMV BLD AUTO: 9.1 FL (ref 9.4–12.4)
RBC # BLD: 5.4 MILL/MM3 (ref 4.7–6.1)
TOTAL PROTEIN: 7 G/DL (ref 6.1–8)
WBC # BLD: 6.3 THOU/MM3 (ref 4.8–10.8)

## 2021-03-06 PROCEDURE — 85027 COMPLETE CBC AUTOMATED: CPT

## 2021-03-06 PROCEDURE — 80076 HEPATIC FUNCTION PANEL: CPT

## 2021-03-06 PROCEDURE — 36415 COLL VENOUS BLD VENIPUNCTURE: CPT

## 2021-03-16 NOTE — PROGRESS NOTES
Mr. Alexandr Ash is a 78-year-old male with a history of left lower back pain/sciatic pain with radiation to the perineal and perianal areas that worsens with prolonged sitting or standing. He was unaware what precipitated his pain initially, however in hindsight, he believes that his pain began after his son accidentally placed too much force on him while trying to assist him with a hip abduction exercise. He presented to our office in December 2020 and his initial evaluation was performed by Dr. Fan Kate. At the time of this evaluation, he had completed a ten day course of Cipro and only noted minimal improvement. He was then started on a three-week course of Doxycycline by Dr. Fan Kate for presumed prostatitis. He had been feeling better while on Doxycyline, however once his antibiotics ran out, his pain slowly returned. He was restarted on an additional three weeks of therapy. Unfortunately, he developed a rash on his face and all medications (Doxycycline, Flomax, and Neurontin) were stopped. Dermatology treated him for seborrheic dermatitis.      Currently, he states that he still harbors left lower back pain that is worse with standing. His pain is of moderate intensity and starts in the left lower back and seems to radiate into the groin, perineal, and perianal area. He states that the pain can be a burning sensation. The groin pain is worse with sitting. Alleviating factors would be positioning supine. He has not really experienced any irritative or obstructive symptomatology with this pain. He  feels he is emptying well. He does report that he had been experiencing left sacral area pain a few months before his perineal pain began. He denies dyschezia but does have a slight pressure sensation with his bowel movements.  He denies any general medical complaints.       Past Medical History:   Diagnosis Date    Bowel trouble        Past Surgical History:   Procedure Laterality Date    COLONOSCOPY  2020       Current Outpatient Medications on File Prior to Visit   Medication Sig Dispense Refill    Probiotic Product (PROBIOTIC ADVANCED PO) Take by mouth daily       ibuprofen (ADVIL;MOTRIN) 200 MG CAPS       acetaminophen (TYLENOL) 325 MG tablet Take 650 mg by mouth every 6 hours as needed for Pain       No current facility-administered medications on file prior to visit. Allergies   Allergen Reactions    Doxycycline Rash     Edema in eyelids       Family History   Problem Relation Age of Onset    Asthma Mother     COPD Mother     Other Father         aortic aneurysm    Stroke Paternal Grandfather        Social History     Socioeconomic History    Marital status:      Spouse name: Not on file    Number of children: Not on file    Years of education: Not on file    Highest education level: Not on file   Occupational History    Not on file   Social Needs    Financial resource strain: Not hard at all   Appuri insecurity     Worry: Never true     Inability: Never true   [employer] needs     Medical: No     Non-medical: No   Tobacco Use    Smoking status: Never Smoker    Smokeless tobacco: Never Used   Substance and Sexual Activity    Alcohol use: No    Drug use: No    Sexual activity: Yes     Partners: Female   Lifestyle    Physical activity     Days per week: Not on file     Minutes per session: Not on file    Stress: Not on file   Relationships    Social connections     Talks on phone: Not on file     Gets together: Not on file     Attends Jainism service: Not on file     Active member of club or organization: Not on file     Attends meetings of clubs or organizations: Not on file     Relationship status: Not on file    Intimate partner violence     Fear of current or ex partner: Not on file     Emotionally abused: Not on file     Physically abused: Not on file     Forced sexual activity: Not on file   Other Topics Concern    Not on file   Social History Narrative    Not on file       . Review of Systems  No problems with ears, nose or throat. No problems with eyes. No chest pain, shortness of breath, abdominal pain, extremity pain or weakness, and no neurological deficits. No rashes. No swollen glands or lymph nodes.  symptoms per HPI. The remainder of the review of symptoms is negative.     Exam    Temp 96.8 °F (36 °C)   Ht 6' (1.829 m)   Wt 165 lb (74.8 kg)   BMI 22.38 kg/m²     Constitutional: Oriented to person, place, and time. Vital signs are normal. Appears well-developed and well-nourished. Cooperative. No distress. HENT:    Head: Normocephalic and atraumatic.    Eyes: EOM are normal. Pupils are equal, round, and reactive to light. Right eye exhibits no discharge. Left eye exhibits no discharge. No scleral icterus. Neck: Trachea normal. No JVD present. Cardiovascular: Normal rate and regular rhythm. S1and S2 normal.  Pulmonary/Chest: Effort normal. No respiratory distress. No wheezes, rhonchi, or rales. Abdominal: Soft. Exhibits no generalized tenderness or distention. There is no rebound, rigidity, or guarding. No CVA tenderness. Musculoskeletal: No pitting edema or calf tenderness bilaterally. Right Iliac spine higher and slightly more forward facing than left. Shoulders seem fairly symmetric. Lymphadenopathy: No superficial cervical or supraclavicular lymphadenopathy. Neurological: Alert and oriented to person, place, and time. No cranial nerve deficit. Skin: Skin is warm and dry. Not diaphoretic. Psychiatric: Normal mood and affect.  Behavior is normal.   Nursing note and vitals reviewed.     Labs    Results for POC orders placed in visit on 03/18/21   POCT Urinalysis No Micro (Auto)   Result Value Ref Range    Glucose, Ur Negative NEGATIVE mg/dl    Bilirubin Urine Negative     Ketones, Urine Negative NEGATIVE    Specific Gravity, Urine >= 1.030 1.002 - 1.030    Blood, UA POC Moderate (A) NEGATIVE    pH, Urine 6.00 5.0 - 9.0    Protein, Urine Negative NEGATIVE mg/dl Urobilinogen, Urine 0.20 0.0 - 1.0 eu/dl    Nitrite, Urine Negative NEGATIVE    Leukocyte Clumps, Urine Negative NEGATIVE    Color, Urine Yellow YELLOW-STRAW    Character, Urine Clear CLR-SL.CLOUD   poct post void residual   Result Value Ref Range    post void residual 0 ml       Lab Results   Component Value Date    CREATININE 1.0 08/13/2020    BUN 14 08/13/2020     08/13/2020    K 3.8 08/13/2020     08/13/2020    CO2 26 08/13/2020       Plan:    Low Back Pain/Perineal Pain- Symptomatic. Patient reports mild to moderate Improvement with initiation of pelvic floor therapy. Patient does report radicular pain. If therapy gains plateau, we will consider MRI of the lumbar and sacrum. Patient would like to hold at this time and try to complete his course of physical therapy. Will start Flexeril 5 mg twice daily as needed. Counseled on potential side effect side effects. Will obtain PSA level at next visit. Two month follow-up appointment. Call immediately if irritative and obstructive symptomatology occurs, fever/chills, gross hematuria, etc.    Microscopic Hematuria- Will send for urine cytology. If microscopic hematuria is persistent, CT imaging and office cystoscopy will be needed.

## 2021-03-17 ENCOUNTER — HOSPITAL ENCOUNTER (OUTPATIENT)
Dept: PHYSICAL THERAPY | Age: 51
Setting detail: THERAPIES SERIES
Discharge: HOME | End: 2021-03-17
Payer: COMMERCIAL

## 2021-03-17 PROCEDURE — 97140 MANUAL THERAPY 1/> REGIONS: CPT | Performed by: PHYSICAL THERAPIST

## 2021-03-17 PROCEDURE — 97110 THERAPEUTIC EXERCISES: CPT | Performed by: PHYSICAL THERAPIST

## 2021-03-17 NOTE — PROGRESS NOTES
[facility]  PHYSICAL THERAPY  OUTPATIENT REHABILITATION - SPECIALIZED THERAPY SERVICES  [] PELVIC HEALTH EVALUATION  [x] DAILY NOTE  [] PROGRESS NOTE [] DISCHARGE NOTE    Date: 3/17/2021  Patient Name:  Adrianna Edge  : 1970  MRN: [id number]  CSN: [id number]    Referring Practitioner ALYSSIA Meyers*   Diagnosis Low back pain [M54.5]  Other chronic pain [G89.29]    Treatment Diagnosis 89 - Other Specified Disorders of Muscle  M54.5 - Low Back Pain  M62.830 - Muscle Spasms of Back, R10.2 - Pelvic and Perineal Pain and R29.3 - Abnormal Posture   L hip pain   Date of Evaluation 21    Additional Pertinent History none      Functional Outcome Measure Used Back Index   Functional Outcome Score 1450 (21)       Insurance: Primary: Payor: Major Agosto [id number] /  /  / ,   Secondary:    Authorization Information: No pre cert   Visit # 5,  for progress note   Visits Allowed: No visit limit   Recertification Date:    Physician Follow-Up: 3/18/21 with Tone Branch   Physician Orders: eval and treat   History of Present Illness: L LBP since approx 2020 with onset of L perineal pain  2020; Gabapentin, Flomax, antibiotic combo have been helpful with the perineal pain; most limited with standing and with sitting     SUBJECTIVE: Pt states his L perineal pain is less of a burning now, more an ache, intermittent in nature. Pt does feel this pain is related to his L LBP. If perineal pain is already flared, pt notices it more when he sits down. L LBP is still a problem, with standing anushka irritating this pain; pain is better in am.  Pt has not done any PT HEP for the past 2 days as he is concerned that maybe LTR is worsening his LBP. Pt reports relaxing in a sitting or reclined position helps to decrease the pain of the LB. Pt still feels a need to stretch this area with minimal pain present with palpation here.   Pt feels good immediately after PT, and then he is sore the next day which lasts for 2-3 days. Pt sees Shanita Escobar tomorrow for follow up, and pt would like to request an MRI to better assess for source of his L LBP in particular. Pain located deep at pt's L SIJ. Pt cont to have many questions and concerns about his condition. Social/Functional History and Current Status:  Medications and Allergies have been reviewed and are listed on Medical History Questionnaire. Pietro Lopez lives with spouse and with family in a multiple floor home with stairs and no handrail to enter. 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 Occupation:  at [employer].        PAIN    Location L LBP   Description Ache, has had a hx of shooting nerve pain   Increased by Prolonged standing, end of day, prolonged walking   Decreased by Lying down   Maximum Intensity 5/10; occas has sharper B pain at 6/10   Best Intensity 1-2/10   Todays Rating 2-3/10   Other/Function Feels like it needs to stretch       PAIN    Location L perineal; deep to the L scrotum   Description Almost burning, dull ache, tight   Increased by Prolonged sitting, initial physical mov't in the am   Decreased by Avoiding sitting, lying down, tries to sit on soft surfaces   Maximum Intensity 3-4/10   Best Intensity 0/10   Todays Rating 0/10   Other/Function        GENERAL ASSESSMENT   [] Deferred secondary to:   Palpation No pain to palp L LB , but pt describes pain at low lumbar/SIJ region   Observation    Posture Increased Thoracic Kyphosis and R shoulder dep   Range of Motion Lumbar noticeable lately but feels it is related to his L LBP. LBP worse with standing and with increased physical activity, relieved by sitting and lying, better in am, not substantially painful to palpate. Pt notices his perineal pain more so in sitting if that pain is already in a state of being flared up. Pt stopped all of his PT ex's 2 days ago as he was concerned they could be causing him worse L LB soreness. I explained that his symptoms have consistently responded positively to spinal flexion, both exercise and with activity. I did hold on the LTR. Pt req's not to over-strain with his ex's/be gentle! Body Structures/Functions/Activity Limitations: Abdominal and core weakness, Limited hip girdle flexibility, Impaired activity tolerance, Impaired muscle tone and Pain  Prognosis: Good    GOALS:  Patient Goal: abolish the pain; be able to stand and sit without pain    Short Term Goals:  Time Frame: 6 weeks  *  Patient to be independent with basic HEP. * Decrease max intensity of LBP to 3/10 and by 25% in frequency to allow increased standing tolerance. * Decrease max intensity of perineal pain to 2/10 and by 50% in frequency to allow for increased sitting tolerance. * Improve R hip IR to 20 degrees and L hip ER to 60 degrees due to reduction of mm tension and improving mm tension symmetry to reduce LB and pelvic strain. Long Term Goals:  Time Frame: 12 weeks  *  Patient to be independent with progressed HEP. * Patient's Back Index Score will improve to 7/50 or less to demonstrate functional improvement in condition. * Decrease max intensity of LBP to 2/10 and present <20% of the day. * Decrease max intensity of perineal pain to 1/10 and present <20% of the day. * Pt to report overall 90% improvement in sitting tolerance due to reduction of pelvic pain. * Pt to report overall 80% improvement in standing tolerance due to reduction of LBP.   * Pt's hip rotation PROM to be equal B to allow for mm symmetry and reduction of pelvic and LB tensions. * Improve pt's hams flexibility with SLR to 70 degrees or greater to reduce LB and pelvic strain. PLAN:  Treatment Recommendations: Strengthening, Range of Motion, Functional Mobility Training, Neuromuscular Re-education, Manual Therapy - Soft Tissue Mobilization, Pain Management, Home Exercise Program, Patient Education, Self-Care Education and Training, Positioning and Integrative Dry Needling    []  Plan of care initiated. Plan to see patient 1 times per week for 12 weeks to address the treatment planned outlined above.   [x]  Continue with current plan of care  []  Modify plan of care as follows:    []  Hold pending physician visit  []  Discharge    Time In 3:15   Time Out 4:15   Timed Code Minutes: 60 min   Total Treatment Time: 60 min       Electronically Signed by: STEWART SHAFFER

## 2021-03-18 ENCOUNTER — OFFICE VISIT (OUTPATIENT)
Dept: UROLOGY | Age: 51
End: 2021-03-18
Payer: COMMERCIAL

## 2021-03-18 VITALS — TEMPERATURE: 96.8 F | HEIGHT: 72 IN | WEIGHT: 165 LBS | BODY MASS INDEX: 22.35 KG/M2

## 2021-03-18 DIAGNOSIS — M54.50 ACUTE LEFT-SIDED LOW BACK PAIN WITHOUT SCIATICA: ICD-10-CM

## 2021-03-18 DIAGNOSIS — R31.29 MICROSCOPIC HEMATURIA: ICD-10-CM

## 2021-03-18 DIAGNOSIS — R39.15 URGENCY OF URINATION: ICD-10-CM

## 2021-03-18 DIAGNOSIS — R10.2 PERINEAL PAIN: Primary | ICD-10-CM

## 2021-03-18 PROCEDURE — 1036F TOBACCO NON-USER: CPT | Performed by: PHYSICIAN ASSISTANT

## 2021-03-18 PROCEDURE — 81003 URINALYSIS AUTO W/O SCOPE: CPT | Performed by: PHYSICIAN ASSISTANT

## 2021-03-18 PROCEDURE — G8482 FLU IMMUNIZE ORDER/ADMIN: HCPCS | Performed by: PHYSICIAN ASSISTANT

## 2021-03-18 PROCEDURE — G8420 CALC BMI NORM PARAMETERS: HCPCS | Performed by: PHYSICIAN ASSISTANT

## 2021-03-18 PROCEDURE — 51798 US URINE CAPACITY MEASURE: CPT | Performed by: PHYSICIAN ASSISTANT

## 2021-03-18 PROCEDURE — G8427 DOCREV CUR MEDS BY ELIG CLIN: HCPCS | Performed by: PHYSICIAN ASSISTANT

## 2021-03-18 PROCEDURE — 99213 OFFICE O/P EST LOW 20 MIN: CPT | Performed by: PHYSICIAN ASSISTANT

## 2021-03-18 RX ORDER — CYCLOBENZAPRINE HCL 5 MG
5 TABLET ORAL 2 TIMES DAILY PRN
Qty: 30 TABLET | Refills: 0 | Status: SHIPPED | OUTPATIENT
Start: 2021-03-18 | End: 2021-03-28

## 2021-03-24 ENCOUNTER — HOSPITAL ENCOUNTER (OUTPATIENT)
Dept: PHYSICAL THERAPY | Age: 51
Setting detail: THERAPIES SERIES
Discharge: HOME | End: 2021-03-24
Payer: COMMERCIAL

## 2021-03-24 PROCEDURE — 97140 MANUAL THERAPY 1/> REGIONS: CPT | Performed by: PHYSICAL THERAPIST

## 2021-03-24 PROCEDURE — 97530 THERAPEUTIC ACTIVITIES: CPT | Performed by: PHYSICAL THERAPIST

## 2021-03-24 PROCEDURE — 97110 THERAPEUTIC EXERCISES: CPT | Performed by: PHYSICAL THERAPIST

## 2021-03-24 NOTE — PROGRESS NOTES
[address] [facility]  PHYSICAL THERAPY  OUTPATIENT REHABILITATION - SPECIALIZED THERAPY SERVICES  [] PELVIC HEALTH EVALUATION  [x] DAILY NOTE  [] PROGRESS NOTE [] DISCHARGE NOTE    Date: 3/24/2021  Patient Name:  Quentin Will  : 1970  MRN: [id number]  CSN: [id number]    Referring Practitioner ALYSSIA Brownlee*   Diagnosis Low back pain [M54.5]  Other chronic pain [G89.29]    Treatment Diagnosis M62.89 - Other Specified Disorders of Muscle  M54.5 - Low Back Pain  M62.830 - Muscle Spasms of Back, R10.2 - Pelvic and Perineal Pain and R29.3 - Abnormal Posture   L hip pain   Date of Evaluation 21    Additional Pertinent History none      Functional Outcome Measure Used Back Index   Functional Outcome Score 14/50 (21)       Insurance: Primary: Payor: Russell Barreto /  /  / ,   Secondary:    Authorization Information: No pre cert   Visit # 6,  for progress note   Visits Allowed: No visit limit   Recertification Date:    Physician Follow-Up: 3/18/21 with Julián Mejia   Physician Orders: eval and treat   History of Present Illness: L LBP since approx 2020 with onset of L perineal pain  2020; Gabapentin, Flomax, antibiotic combo have been helpful with the perineal pain; most limited with standing and with sitting     SUBJECTIVE: Pt did see Julián Mejia, and it has been decided to hold off on an MRI at this time. Pt was given a mm relaxer which he has been taking qd which has not done much to pt's symptoms thus far. Pt still having most L SIJ pain with prolonged standing and physical activity. Pt has tried standing trunk flexion which does help slightly to reduce the pain. Sitting or lying still relieves it the most.  L scrotal/anal pain is still overall less intense than initially, more of an ache or a nerve twinge than a burn, still has pain intermittent here. Pt reports less pain after last wk's PT session the next day.     Social/Functional History and Current Status:  Medications and Allergies have been reviewed and are listed on Medical History Questionnaire. Leila Burgess lives with spouse and with family in a multiple floor home with stairs and no handrail to enter. 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 Occupation:  at [employer]. PAIN    Location L LBP   Description Ache, has had a hx of shooting nerve pain   Increased by Prolonged standing, end of day, prolonged walking   Decreased by Lying down   Maximum Intensity 5/10 like a gripping   Best Intensity 1/10   Todays Rating 2-3/10   Other/Function Feels like it needs to stretch       PAIN    Location L perineal; deep to the L scrotum and toward anus   Description Almost burning, dull ache, tight   Increased by Prolonged sitting, initial physical mov't in the am   Decreased by Avoiding sitting, lying down, tries to sit on soft surfaces   Maximum Intensity 3-4/10   Best Intensity 0/10   Todays Rating 1/10   Other/Function        GENERAL ASSESSMENT   [] Deferred secondary to:   Palpation No pain to palp L LB , but pt describes pain at low lumbar/SIJ region   Observation    Posture Increased Thoracic Kyphosis and R shoulder dep   Range of Motion Lumbar flexion 25% dec due to tightness, mild increased L LBP with flex and ext; remaining lumbar AROM WFL and without c/o;  B LE AROM WFL and without c/o   Strength B LE strength WNL, pt can \"feel it\" at his L LB with all L LE MMT and with abdominal MMT; abdom strength 3-/5   Gait WNL   Sensation WNL   Edema WNL   Balance/Fall History Denies balance or fall problems   Special Tests Neg Homans, tight hams B at 50 degree SLR, mild pos L SLR at end ROM; tight piri B, L hip ER is tight at 45 degrees, R hip IR is very tight at 15 degrees, Neg FADIR, R GUNNAR causes some R hip pain, L GUNNAR is 50% more tight than R, Neg Scours, Neg Sanchez, Neg SIJ distraction,            OBSERVATION  [] Deferred secondary to:   Patient Safety Patient offered a chaperone and declined.    Skin Condition intact   Urogenital Triangle No tenderness or tightness reported       PELVIC FLOOR EXTERNAL EXAM [] Deferred secondary to:   Exam perineal body   Sensation Intact   Muscle Localization Good - minus after instruction and cues to decrease effort   Palpation/Tone Normal   Pelvic Floor Strength PERF:Power: 2,Endurance: 3,Reps: 5,Flicks: 10   Relax after Contraction Normal       TREATMENT   Precautions:     X in shaded column indicates activity completed today   Manual Therapy Time/Technique  Notes         Prone L SIJ region CTM and into L isch tub/sacrospinous ligament region 15 min x    Manual MET for L pelvic outflare and supine L leg pull 5 min x    Exercise/Intervention    Notes   Basic pelvic anatomy and nature of condition; instruction on PFM localization 2 min  x    Avoid LE asymmetry 2 min  x    Elevator analogy of normalizing PFM tone 2 min  x    kegel-quick 1 sec 10 x Qd to bid, sit or lie   kegel-hold 3 sec 10 x Qd to bid   LTR 3 sec to the R; 5 sec to the L 10  hold   Supine adductor stretch 30 sec 3  hold   Donnie stretch 1 min   hold   MET L quad with R hams       hooklying ball squeeze 5 sec 10 x    piri stretch 20 sec 3 x    Hams stretch-LeSeague's position 20 sec 3 x    SKC       DKC 5 sec 5 x    Pelvic tilt 5 sec 10 x    Instructed self use of tiger tail or balls for pressure points at L SIJ 2 min  x           Prone over 2 pillows windshield wipers  5 x    Sacral repositioning technique 3 sec 10 x    90/90 hip lift Specific Interventions Next Treatment: review current instructions and progress home program as approp to symptoms;instruct 90/90 hip lift; continue manual therapy as approp to symptoms    Activity/Treatment Tolerance:  [x]  Patient tolerated treatment well  []  Patient limited by fatigue  []  Patient limited by pain   []  Patient limited by medical complications  []  Other:     Patient Education:   [x]  HEP/Education Completed:  Rev'd all therex/acts with pt and progressed by instructing sacral correction technique and prone windshield wipers. Utilized 2 pillows under hips today for prone position which pt found to be relieving of his L sacral pain. Cont to remind pt as trunk flexion option of relieving pain when doing prolonged standing tasks. Ended with manual therapy as listed; pt more tight today at his L SIJ but still not notably tender to palpate here. []  No new Education completed  [x]  Reviewed Prior HEP      [x]  Patient verbalized and/or demonstrated understanding of education provided. []  Patient unable to verbalize and/or demonstrate understanding of education provided. Will continue education. [x]  Barriers to learning: none    Assessment: Pt is still having L SIJ pain with prolonged standing which relieves upon lying or sitting. Pt more tight today at L SIJ. He still has the perineal pain, and he still feels this is more noticeable when his LBP is more flared. Pt working with home instructions and still has many questions about his condition and its management. Body Structures/Functions/Activity Limitations: Abdominal and core weakness, Limited hip girdle flexibility, Impaired activity tolerance, Impaired muscle tone and Pain  Prognosis: Good    GOALS:  Patient Goal: abolish the pain; be able to stand and sit without pain    Short Term Goals:  Time Frame: 6 weeks  *  Patient to be independent with basic HEP.   * Decrease max intensity of LBP to 3/10 and by 25% in frequency to allow increased standing tolerance. * Decrease max intensity of perineal pain to 2/10 and by 50% in frequency to allow for increased sitting tolerance. * Improve R hip IR to 20 degrees and L hip ER to 60 degrees due to reduction of mm tension and improving mm tension symmetry to reduce LB and pelvic strain. Long Term Goals:  Time Frame: 12 weeks  *  Patient to be independent with progressed HEP. * Patient's Back Index Score will improve to 7/50 or less to demonstrate functional improvement in condition. * Decrease max intensity of LBP to 2/10 and present <20% of the day. * Decrease max intensity of perineal pain to 1/10 and present <20% of the day. * Pt to report overall 90% improvement in sitting tolerance due to reduction of pelvic pain. * Pt to report overall 80% improvement in standing tolerance due to reduction of LBP. * Pt's hip rotation PROM to be equal B to allow for mm symmetry and reduction of pelvic and LB tensions. * Improve pt's hams flexibility with SLR to 70 degrees or greater to reduce LB and pelvic strain. PLAN:  Treatment Recommendations: Strengthening, Range of Motion, Functional Mobility Training, Neuromuscular Re-education, Manual Therapy - Soft Tissue Mobilization, Pain Management, Home Exercise Program, Patient Education, Self-Care Education and Training, Positioning and Integrative Dry Needling    []  Plan of care initiated. Plan to see patient 1 times per week for 12 weeks to address the treatment planned outlined above.   [x]  Continue with current plan of care  []  Modify plan of care as follows:    []  Hold pending physician visit  []  Discharge    Time In 4:05   Time Out 5:05   Timed Code Minutes: 60 min   Total Treatment Time: 60 min       Electronically Signed by: STEWART SHAFFER

## 2021-03-31 ENCOUNTER — HOSPITAL ENCOUNTER (OUTPATIENT)
Dept: PHYSICAL THERAPY | Age: 51
Setting detail: THERAPIES SERIES
Discharge: HOME | End: 2021-03-31
Payer: COMMERCIAL

## 2021-03-31 PROCEDURE — 97110 THERAPEUTIC EXERCISES: CPT | Performed by: PHYSICAL THERAPIST

## 2021-03-31 PROCEDURE — 97530 THERAPEUTIC ACTIVITIES: CPT | Performed by: PHYSICAL THERAPIST

## 2021-03-31 PROCEDURE — 97140 MANUAL THERAPY 1/> REGIONS: CPT | Performed by: PHYSICAL THERAPIST

## 2021-03-31 NOTE — PROGRESS NOTES
[address] [facility]  PHYSICAL THERAPY  OUTPATIENT REHABILITATION - SPECIALIZED THERAPY SERVICES  [] PELVIC HEALTH EVALUATION  [x] DAILY NOTE  [] PROGRESS NOTE [] DISCHARGE NOTE    Date: 3/31/2021  Patient Name:  Erin Vera  : 1970  MRN: [id number]  CSN: [id number]    Referring Practitioner ALYSSIA Templeton*   Diagnosis Low back pain [M54.5]  Other chronic pain [G89.29]    Treatment Diagnosis M62.89 - Other Specified Disorders of Muscle  M54.5 - Low Back Pain  M62.830 - Muscle Spasms of Back, R10.2 - Pelvic and Perineal Pain and R29.3 - Abnormal Posture   L hip pain   Date of Evaluation 21    Additional Pertinent History none      Functional Outcome Measure Used Back Index   Functional Outcome Score 14/50 (21)       Insurance: Primary: Payor: Terese Villalta /  /  / ,   Secondary:    Authorization Information: No pre cert   Visit # 7, 9/06 for progress note   Visits Allowed: No visit limit   Recertification Date: 7694   Physician Follow-Up: 3/18/21 with Keiry Escoto   Physician Orders: eval and treat   History of Present Illness: L LBP since approx 2020 with onset of L perineal pain  2020; Gabapentin, Flomax, antibiotic combo have been helpful with the perineal pain; most limited with standing and with sitting     SUBJECTIVE: Pt reports he's had less sharp pain of his L LB in the past few days. Pt's L perineal pain (post aspect) is still present as well. Pt feels these two pains are related to each. Pain still worse with prolonged standing. Standing trunk flexion is helpful; sitting or lying is best at resolving the pain. Pt reports pain reduction after PT with less soreness the next day after PT. Social/Functional History and Current Status:  Medications and Allergies have been reviewed and are listed on Medical History Questionnaire.     Erin Vera lives with spouse and with family in a multiple floor home with stairs and no handrail to enter.    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 Occupation:  at [employer]. PAIN    Location L LBP   Description Ache, has had a hx of shooting nerve pain   Increased by Prolonged standing, end of day, prolonged walking, activity   Decreased by Lying down   Maximum Intensity 4/10 like a gripping/tight   Best Intensity 0/10   Todays Rating 3/10   Other/Function Feels like it needs to stretch       PAIN    Location L perineal; deep to the L scrotum and toward anus   Description Almost burning, dull ache, tight   Increased by Prolonged sitting, initial physical mov't in the am   Decreased by Avoiding sitting, lying down, tries to sit on soft surfaces   Maximum Intensity 3-4/10   Best Intensity 0/10   Todays Rating 1-2/10   Other/Function        GENERAL ASSESSMENT   [] Deferred secondary to:   Palpation No pain to palp L LB , but pt describes pain at low lumbar/SIJ region   Observation    Posture Increased Thoracic Kyphosis and R shoulder dep   Range of Motion Lumbar flexion 25% dec due to tightness, mild increased L LBP with flex and ext; remaining lumbar AROM WFL and without c/o;  B LE AROM WFL and without c/o   Strength B LE strength WNL, pt can \"feel it\" at his L LB with all L LE MMT and with abdominal MMT; abdom strength 3-/5   Gait WNL   Sensation WNL   Edema WNL   Balance/Fall History Denies balance or fall problems   Special Tests Neg Homans, tight hams B at 50 degree SLR, mild pos L SLR at end ROM; tight piri B, L hip ER is tight at 45 degrees, R hip IR is very tight at 15 degrees, Neg FADIR, R GUNNAR causes some R hip pain, L GUNNAR is 50% more tight than R, Neg Scours, Neg Sanchez, Neg SIJ distraction,            OBSERVATION  [] Deferred secondary to:   Patient Safety Patient offered a chaperone and declined.    Skin Condition intact   Urogenital Triangle No tenderness or tightness reported       PELVIC FLOOR EXTERNAL EXAM [] Deferred secondary to:   Exam perineal body   Sensation Intact   Muscle Localization Good - minus after instruction and cues to decrease effort   Palpation/Tone Normal   Pelvic Floor Strength PERF:Power: 2,Endurance: 3,Reps: 5,Flicks: 10   Relax after Contraction Normal       TREATMENT   Precautions:     X in shaded column indicates activity completed today   Manual Therapy Time/Technique  Notes         Prone L SIJ region CTM and into L isch tub/sacrospinous ligament region 15 min x    Manual MET for L pelvic outflare and supine L leg pull 5 min x    Exercise/Intervention    Notes   Basic pelvic anatomy and nature of condition; instruction on PFM localization 2 min  x    Avoid LE asymmetry 2 min  x    Elevator analogy of normalizing PFM tone 2 min  x    kegel-quick 1 sec 10 x Qd to bid, sit or lie   kegel-hold 3 sec 10 x Qd to bid   LTR 3 sec to the R; 5 sec to the L 10  hold   Supine adductor stretch 30 sec 3  hold   Donnie stretch 1 min   hold   MET L quad with R hams       hooklying ball squeeze 5 sec 10 x    piri stretch 20 sec 3 x    Hams stretch-LeSeague's position 20 sec 3 x    SKC       DKC 5 sec 5 x    Pelvic tilt 5 sec 10 x    Instructed self use of tiger tail or balls for pressure points at L SIJ 2 min  x           Prone over 2 pillows windshield wipers 3 sec 5 x    Sacral repositioning technique 10 sec 3 x    90/90 hip lift                                            Specific Interventions Next Treatment: review current instructions and progress home program as approp to symptoms;instruct 90/90 hip lift; continue manual therapy as approp to symptoms    Activity/Treatment Tolerance:  [x]  Patient tolerated treatment well  []  Patient limited by fatigue  []  Patient limited by pain   []  Patient limited by medical complications  []  Other:     Patient Education:   [x]  HEP/Education Completed:  Rev'd all therex/acts with pt and cont to stress importance of symptom control via use of spinal flexion as approp. Ended with manual therapy as listed; pt tight today at his L SIJ and some at his medial L isch tub.  []  No new Education completed  [x]  Reviewed Prior HEP      [x]  Patient verbalized and/or demonstrated understanding of education provided. []  Patient unable to verbalize and/or demonstrate understanding of education provided. Will continue education. [x]  Barriers to learning: none    Assessment: Pt is still having pain but the intensity and decreased a bit in the past several days. Pt still tight at L SIJ, inf sacral, and med isch tub regions. Did not progress home routine today and cont to educate importance of self management of symptoms flares. Body Structures/Functions/Activity Limitations: Abdominal and core weakness, Limited hip girdle flexibility, Impaired activity tolerance, Impaired muscle tone and Pain  Prognosis: Good    GOALS:  Patient Goal: abolish the pain; be able to stand and sit without pain    Short Term Goals:  Time Frame: 6 weeks  *  Patient to be independent with basic HEP. * Decrease max intensity of LBP to 3/10 and by 25% in frequency to allow increased standing tolerance. * Decrease max intensity of perineal pain to 2/10 and by 50% in frequency to allow for increased sitting tolerance. * Improve R hip IR to 20 degrees and L hip ER to 60 degrees due to reduction of mm tension and improving mm tension symmetry to reduce LB and pelvic strain. Long Term Goals:  Time Frame: 12 weeks  *  Patient to be independent with progressed HEP.   * Patient's Back Index Score will improve to 7/50 or less to demonstrate

## 2021-04-23 ENCOUNTER — TELEPHONE (OUTPATIENT)
Dept: UROLOGY | Age: 51
End: 2021-04-23

## 2021-04-23 DIAGNOSIS — M54.50 LEFT LOW BACK PAIN, UNSPECIFIED CHRONICITY, UNSPECIFIED WHETHER SCIATICA PRESENT: Primary | ICD-10-CM

## 2021-04-23 DIAGNOSIS — R10.2 PELVIC PAIN: ICD-10-CM

## 2021-04-23 NOTE — TELEPHONE ENCOUNTER
Will schedule patient for a MRI of the lumbar spine and pelvis. Please see if patient can have these performed in an open MRI. Can you please let radiology know that the majority of his pain is coming from the left SI joint.

## 2021-04-26 NOTE — TELEPHONE ENCOUNTER
Patient advised MRI would be ordered. He voiced understanding. He would like to go to a facility with an open MRI. He would like to make the insurance will cover the MRI. Please schedule Thank you.

## 2021-04-27 NOTE — TELEPHONE ENCOUNTER
Will have to obtain prior authorization from [address] [place] [address] before patient can schedule.

## 2021-04-30 NOTE — TELEPHONE ENCOUNTER
CT pelvis with and without contrast ordered. I would have Mr. Jairo Beatty get CT first. Wait on MRI of lumbar spine or he may have to go back twice for MRIs if they eventually authorize MRI of the pelvis eventually. It is up to the patient.

## 2021-04-30 NOTE — TELEPHONE ENCOUNTER
All clinical records were forwarded to [address] [place] [address] including Kasandra's notes. I am thinking they are wanting CT scan prior to them doing a MRI of the pelvis.

## 2021-04-30 NOTE — TELEPHONE ENCOUNTER
What are all the details needed? He had physical therapy with Jonnie Oconnor addressing his lower back, hip, and pelvis. He states that he has not had improvement with weeks of therapy. Kasandra has confirmed that his therapy has reached a plateau. Do they need documentation from Kasandra? A pelvic Xray is not going to provide us with any additional information.

## 2021-05-10 NOTE — TELEPHONE ENCOUNTER
[facility] [employer] denied patient's CT pelvis. Denial reason:    Based on [employer] Musculoskeletal Imaging Guidelines Section(s):  MS 23 Pelvis and 1.0 General Guidelines, we cannot approve this request.  Your records show that you have a problem related to your pelvis. The reason this request cannot be approved is because:  1. We need to see results of an x-ray taken after your symptoms started or changed that failed to provide all of the details needed.

## 2021-05-13 NOTE — TELEPHONE ENCOUNTER
Patient called back. He will go to [facility] for his pelvis x-rays. Natali Lane,    Patient has an appointment with you on 05-20 for reevaluation. Do you want him to still come in for that appointment?

## 2021-05-14 NOTE — TELEPHONE ENCOUNTER
I would like for him to have the results of his MRI(s) back before I see him back for follow-up. We can move his appointment back two or three weeks. Hopefully, he will have his MRI imaging by then.

## 2021-05-14 NOTE — TELEPHONE ENCOUNTER
Patient notified. He will call [facility] to schedule that around his schedule and call back.

## 2021-05-15 ENCOUNTER — HOSPITAL ENCOUNTER (OUTPATIENT)
Age: 51
Discharge: HOME | End: 2021-05-15
Payer: COMMERCIAL

## 2021-05-15 ENCOUNTER — HOSPITAL ENCOUNTER (OUTPATIENT)
Dept: GENERAL RADIOLOGY | Age: 51
Discharge: HOME | End: 2021-05-15
Payer: COMMERCIAL

## 2021-05-15 DIAGNOSIS — R10.2 PELVIC PAIN: ICD-10-CM

## 2021-05-15 DIAGNOSIS — M54.50 LEFT LOW BACK PAIN, UNSPECIFIED CHRONICITY, UNSPECIFIED WHETHER SCIATICA PRESENT: ICD-10-CM

## 2021-05-15 PROCEDURE — 72170 X-RAY EXAM OF PELVIS: CPT

## 2021-05-17 ENCOUNTER — HOSPITAL ENCOUNTER (OUTPATIENT)
Age: 51
Discharge: HOME | End: 2021-05-17
Payer: COMMERCIAL

## 2021-05-17 DIAGNOSIS — R10.2 PERINEAL PAIN: ICD-10-CM

## 2021-05-17 LAB — PROSTATE SPECIFIC ANTIGEN: 1.44 NG/ML (ref 0–1)

## 2021-05-17 PROCEDURE — 84153 ASSAY OF PSA TOTAL: CPT

## 2021-05-17 PROCEDURE — 36415 COLL VENOUS BLD VENIPUNCTURE: CPT

## 2021-05-19 NOTE — PROGRESS NOTES
Medical History:   Diagnosis Date    Bowel trouble        Past Surgical History:   Procedure Laterality Date    COLONOSCOPY  2020       Current Outpatient Medications on File Prior to Visit   Medication Sig Dispense Refill    Hydrocort-Pramoxine, Perianal, (ANALPRAM-HC) 2.5-1 % rectal cream Place 2.5 g rectally 3 times daily      ibuprofen (ADVIL;MOTRIN) 200 MG CAPS       acetaminophen (TYLENOL) 325 MG tablet Take 650 mg by mouth every 6 hours as needed for Pain      Probiotic Product (PROBIOTIC ADVANCED PO) Take by mouth daily        No current facility-administered medications on file prior to visit. Allergies   Allergen Reactions    Doxycycline Rash     Edema in eyelids       Family History   Problem Relation Age of Onset    Asthma Mother     COPD Mother     Other Father         aortic aneurysm    Stroke Paternal Grandfather        Social History     Socioeconomic History    Marital status:      Spouse name: Not on file    Number of children: Not on file    Years of education: Not on file    Highest education level: Not on file   Occupational History    Not on file   Tobacco Use    Smoking status: Never Smoker    Smokeless tobacco: Never Used   Substance and Sexual Activity    Alcohol use: No    Drug use: No    Sexual activity: Yes     Partners: Female   Other Topics Concern    Not on file   Social History Narrative    Not on file     Social Determinants of Health     Financial Resource Strain:     Difficulty of Paying Living Expenses:    Food Insecurity:     Worried About Running Out of Food in the Last Year:     [address] in the Last Year:    Transportation Needs:     Lack of Transportation (Medical):      Lack of Transportation (Non-Medical):    Physical Activity:     Days of Exercise per Week:     Minutes of Exercise per Session:    Stress:     Feeling of Stress :    Social Connections:     Frequency of Communication with Friends and Family:     Frequency of Social Gatherings with Friends and Family:     Attends Shinto Services:     Active Member of Clubs or Organizations:     Attends Club or Organization Meetings:     Marital Status:    Intimate Partner Violence:     Fear of Current or Ex-Partner:     Emotionally Abused:     Physically Abused:     Sexually Abused:        Review of Systems  No problems with ears, nose or throat. No problems with eyes. No chest pain, shortness of breath, abdominal pain, extremity pain or weakness, and no neurological deficits. No rashes. No swollen glands or lymph nodes.  symptoms per HPI. The remainder of the review of symptoms is negative. Exam    BP (!) 178/100   Ht 6' (1.829 m)   Wt 166 lb 8 oz (75.5 kg)   BMI 22.58 kg/m²     Constitutional: Oriented to person, place, and time. Vital signs are normal. Appears well-developed and well-nourished. Cooperative. No distress. HENT:    Head: Normocephalic and atraumatic.    Eyes: EOM are normal. Pupils are equal, round, and reactive to light. Right eye exhibits no discharge. Left eye exhibits no discharge. No scleral icterus. Neck: Trachea normal. No JVD present.    Cardiovascular: Normal rate and regular rhythm. S1and S2 normal.  Pulmonary/Chest: Effort normal. No respiratory distress. No wheezes, rhonchi, or rales. Abdominal: Soft. Exhibits no generalized tenderness or distention. There is no rebound, rigidity, or guarding. No CVA tenderness. Lymphadenopathy: No superficial cervical or supraclavicular lymphadenopathy. Neurological: Alert and oriented to person, place, and time. No cranial nerve deficit. Skin: Skin is warm and dry. Not diaphoretic. Psychiatric: Normal mood and affect. Behavior is normal.   Nursing note and vitals reviewed.     Labs    Results for POC orders placed in visit on 05/20/21   POCT Urinalysis No Micro (Auto)   Result Value Ref Range    Glucose, Ur Negative NEGATIVE mg/dl    Bilirubin Urine Negative     Ketones, Urine Negative NEGATIVE    Specific Gravity, Urine 1.015 1.002 - 1.030    Blood, UA POC Moderate (A) NEGATIVE    pH, Urine 5.50 5.0 - 9.0    Protein, Urine Negative NEGATIVE mg/dl    Urobilinogen, Urine 0.20 0.0 - 1.0 eu/dl    Nitrite, Urine Negative NEGATIVE    Leukocyte Clumps, Urine Negative NEGATIVE    Color, Urine Yellow YELLOW-STRAW    Character, Urine Clear [id number].[id number]       Lab Results   Component Value Date    CREATININE 1.0 08/13/2020    BUN 14 08/13/2020     08/13/2020    K 3.8 08/13/2020     08/13/2020    CO2 26 08/13/2020       Lab Results   Component Value Date    PSA 1.44 (H) 05/17/2021     Radiology:     MRI of the Lumbar Spine (5/18/21)    FINDINGS:          T12-L1:  Normal endplates.  Normal disc height, hydration and morphology.     Normal bilateral facet joints.  Normal central canal and bilateral lateral     recesses.  Normal bilateral intervertebral neural foramina.          Normal lumbar lordosis.  There is no substantial scoliosis.  Normal conus     medullaris that terminates at the L1/L2.  Slitlike appearance to the     neural foramina suggestive of congenital spinal stenosis (short pedicles).          L1-2:  Normal endplates.  Normal disc height, hydration and morphology.     Normal bilateral facet joints.  Normal central canal and bilateral lateral     recesses.  Normal bilateral intervertebral neural foramina.          L2-3:  Some disc desiccation but no disc protrusion, spinal stenosis, or     neural foraminal stenosis.           L3-4:  Normal endplates.  Normal disc height, hydration and morphology.     Normal bilateral facet joints.  Normal central canal and bilateral lateral     recesses.  Normal bilateral intervertebral neural foramina.          L4-5:  Normal endplates.  Normal disc height, hydration and morphology.     Normal bilateral facet joints.  Normal central canal and bilateral lateral     recesses.  Normal bilateral intervertebral neural foramina.          L5-S1:  Normal endplates.  Normal disc height, hydration and morphology.     Normal bilateral facet joints.  Normal central canal and bilateral lateral     recesses.  Normal bilateral intervertebral neural foramina.          Normal visualized sacral ala.          Normal visualized paraspinous soft tissue structures.          There is no demonstrated abnormal enhancement.     ___________________________________          IMPRESSION:     Suspect congenital spinal stenosis (short pedicles) but no superimposed     degenerative disc disease.       XR Pelvis (5/15/21)    FINDINGS:           The pelvic ring is intact.  There is no fracture or dislocation of either hip.     The superior and inferior pubic rami are intact.  The sacrum and sacroiliac joints appear normal. The sacral struts are intact.       There are mild degenerative changes in each hip. There is superior acetabular sclerosis. This is more pronounced on the right than the left. No suspicious osseous lesions are present. There are few benign phleboliths in the pelvis.                    Impression       1. Mild degenerative changes in each hip. These are more pronounced on the right than the left. 2. No other abnormalities. Assessment/Plan:    Low Back Pain/Perineal Pain- Symptomatic. Patient reports mild improvement only with initiation of pelvic floor therapy. Patient does report radicular pain. MRI of the lumbar spine with no pathology that would explain his current symptomatology. Pelvic XR demonstrated mild degenerative changes in each hip (right>left). Recommend MRI of the pelvis. Patient would like to hold at this time. Due to intermittent, long-standing irritative symptomatology, Mr. Margarita Heart will be treated with a six-week course of Bactrim DS for presumed prostatitis. Two month follow-up appointment will be scheduled.  Call immediately worsening irritative and obstructive symptomatology, fever/chills, gross hematuria, etc.     Microscopic Hematuria- Will send for urine cytology. If microscopic hematuria is persistent, CT imaging and office cystoscopy will be needed. Will reassess after six-week course of antibiotics for prostatitis. Prostate Cancer Screening- PSA (5/15/21) is 1.44. No other values for comparison.

## 2021-05-20 ENCOUNTER — OFFICE VISIT (OUTPATIENT)
Dept: UROLOGY | Age: 51
End: 2021-05-20
Payer: COMMERCIAL

## 2021-05-20 ENCOUNTER — TELEPHONE (OUTPATIENT)
Dept: UROLOGY | Age: 51
End: 2021-05-20

## 2021-05-20 VITALS
WEIGHT: 166.5 LBS | HEIGHT: 72 IN | BODY MASS INDEX: 22.55 KG/M2 | DIASTOLIC BLOOD PRESSURE: 100 MMHG | SYSTOLIC BLOOD PRESSURE: 178 MMHG

## 2021-05-20 DIAGNOSIS — N41.1 PROSTATITIS, CHRONIC: ICD-10-CM

## 2021-05-20 DIAGNOSIS — R31.29 MICROSCOPIC HEMATURIA: ICD-10-CM

## 2021-05-20 DIAGNOSIS — M54.50 LEFT LOW BACK PAIN, UNSPECIFIED CHRONICITY, UNSPECIFIED WHETHER SCIATICA PRESENT: Primary | ICD-10-CM

## 2021-05-20 DIAGNOSIS — N41.1 CHRONIC PROSTATITIS: ICD-10-CM

## 2021-05-20 LAB
BILIRUBIN URINE: NEGATIVE
BLOOD URINE, POC: ABNORMAL
CHARACTER, URINE: CLEAR
COLOR, URINE: YELLOW
GLUCOSE URINE: NEGATIVE MG/DL
KETONES, URINE: NEGATIVE
LEUKOCYTE CLUMPS, URINE: NEGATIVE
NITRITE, URINE: NEGATIVE
PH, URINE: 5.5 (ref 5–9)
PROTEIN, URINE: NEGATIVE MG/DL
SPECIFIC GRAVITY, URINE: 1.01 (ref 1–1.03)
UROBILINOGEN, URINE: 0.2 EU/DL (ref 0–1)

## 2021-05-20 PROCEDURE — G8427 DOCREV CUR MEDS BY ELIG CLIN: HCPCS | Performed by: PHYSICIAN ASSISTANT

## 2021-05-20 PROCEDURE — 1036F TOBACCO NON-USER: CPT | Performed by: PHYSICIAN ASSISTANT

## 2021-05-20 PROCEDURE — G8420 CALC BMI NORM PARAMETERS: HCPCS | Performed by: PHYSICIAN ASSISTANT

## 2021-05-20 PROCEDURE — 81003 URINALYSIS AUTO W/O SCOPE: CPT | Performed by: PHYSICIAN ASSISTANT

## 2021-05-20 PROCEDURE — 99213 OFFICE O/P EST LOW 20 MIN: CPT | Performed by: PHYSICIAN ASSISTANT

## 2021-05-20 RX ORDER — HYDROCORTISONE ACETATE PRAMOXINE HCL 2.5; 1 G/100G; G/100G
2.5 CREAM TOPICAL 3 TIMES DAILY
COMMUNITY
Start: 2021-05-11 | End: 2021-07-15

## 2021-05-20 RX ORDER — SULFAMETHOXAZOLE AND TRIMETHOPRIM 800; 160 MG/1; MG/1
1 TABLET ORAL 2 TIMES DAILY
Qty: 84 TABLET | Refills: 0 | Status: SHIPPED | OUTPATIENT
Start: 2021-05-20 | End: 2021-07-01

## 2021-07-14 NOTE — PROGRESS NOTES
Mr. Cynthia Kaur is a 42-year-old male with a history of left lower back pain/sciatic pain with radiation to the perineal and perianal areas that worsens with prolonged sitting or standing. He was unaware what precipitated his pain initially, however in hindsight, he believes that his pain began after his son accidentally placed too much force on him while trying to assist him with a hip abduction exercise. He presented to our office in December 2020 and his initial evaluation was performed by Dr. Solis Graft the time of this evaluation, he had completed a ten-day course of Cipro and only noted minimal improvement. He was then started on a three-week course of Doxycycline by Dr. Nahomi Benavidez for presumed prostatitis. He had been feeling better while on Doxycyline, however once his antibiotics ran out, his pain slowly returned. He was restarted on an additional three weeks of therapy. Unfortunately, he developed a rash on his face and neck and all medications (Doxycycline, Flomax, and Neurontin) were stopped. Dermatology did not believe this was due to an allergic reaction and treated him for seborrheic dermatitis.      He was started on a six-week course of Bactrim and states that he still harbors left lower back, which radiates into the groin, perineal, and perianal area, however it has improved in intensity with the recent antibiotic therapy. He has noticed less of a burning sensation. Alleviating factors would be positioning supine. He states that he feels the best when he gets up in the morning. He feels that his ejaculation is impaired at times but this has improved with antibiotic therapy as well. He denies weakened stream or urinary retention. He reports mild dyschezia when he has a bowel movement. He did have a colonoscopy last fall. He denies any general medical complaints. He presents today for follow-up.       Past Medical History:   Diagnosis Date    Bowel trouble        Past Surgical History:   Procedure Laterality Date  COLONOSCOPY  2020       Current Outpatient Medications on File Prior to Visit   Medication Sig Dispense Refill    ibuprofen (ADVIL;MOTRIN) 200 MG CAPS       acetaminophen (TYLENOL) 325 MG tablet Take 650 mg by mouth every 6 hours as needed for Pain      Probiotic Product (PROBIOTIC ADVANCED PO) Take by mouth daily        No current facility-administered medications on file prior to visit. Allergies   Allergen Reactions    Doxycycline Rash     Edema in eyelids       Family History   Problem Relation Age of Onset    Asthma Mother     COPD Mother     Other Father         aortic aneurysm    Stroke Paternal Grandfather        Social History     Socioeconomic History    Marital status:      Spouse name: Not on file    Number of children: Not on file    Years of education: Not on file    Highest education level: Not on file   Occupational History    Not on file   Tobacco Use    Smoking status: Never Smoker    Smokeless tobacco: Never Used   Substance and Sexual Activity    Alcohol use: No    Drug use: No    Sexual activity: Yes     Partners: Female   Other Topics Concern    Not on file   Social History Narrative    Not on file     Social Determinants of Health     Financial Resource Strain:     Difficulty of Paying Living Expenses:    Food Insecurity:     Worried About Running Out of Food in the Last Year:     [address] in the Last Year:    Transportation Needs:     Lack of Transportation (Medical):      Lack of Transportation (Non-Medical):    Physical Activity:     Days of Exercise per Week:     Minutes of Exercise per Session:    Stress:     Feeling of Stress :    Social Connections:     Frequency of Communication with Friends and Family:     Frequency of Social Gatherings with Friends and Family:     Attends Rastafarian Services:     Active Member of Clubs or Organizations:     Attends Club or Organization Meetings:     Marital Status:    Intimate Partner Violence:  Fear of Current or Ex-Partner:     Emotionally Abused:     Physically Abused:     Sexually Abused:        Review of Systems  No problems with ears, nose or throat. No problems with eyes. No chest pain, shortness of breath, abdominal pain, extremity pain or weakness, and no neurological deficits. No rashes. No swollen glands or lymph nodes.  symptoms per HPI. The remainder of the review of symptoms is negative. Exam    /78   Ht 6' (1.829 m)   Wt 164 lb 14.4 oz (74.8 kg)   BMI 22.36 kg/m²     Constitutional: Oriented to person, place, and time. Vital signs are normal. Appears well-developed and well-nourished. Cooperative. No distress. HENT:    Head: Normocephalic and atraumatic.    Eyes: EOM are normal. Pupils are equal, round, and reactive to light. Right eye exhibits no discharge. Left eye exhibits no discharge. No scleral icterus. Neck: Trachea normal. No JVD present.    Cardiovascular: Normal rate and regular rhythm. S1and S2 normal.  Pulmonary/Chest: Effort normal. No respiratory distress. No wheezes, rhonchi, or rales. Abdominal: Soft. Exhibits no generalized tenderness or distention. There is no rebound, rigidity, or guarding. No CVA tenderness. Lymphadenopathy: No superficial cervical or supraclavicular lymphadenopathy. Neurological: Alert and oriented to person, place, and time. No cranial nerve deficit. Skin: Skin is warm and dry. Not diaphoretic. Psychiatric: Normal mood and affect. Behavior is normal.   Nursing note and vitals reviewed.       Labs    Results for POC orders placed in visit on 07/15/21   POCT Urinalysis No Micro (Auto)   Result Value Ref Range    Glucose, Ur Negative NEGATIVE mg/dl    Bilirubin Urine Small (A)     Ketones, Urine Trace (A) NEGATIVE    Specific Gravity, Urine >= 1.030 1.002 - 1.030    Blood, UA POC Large (A) NEGATIVE    pH, Urine 5.50 5.0 - 9.0    Protein, Urine 30 (A) NEGATIVE mg/dl    Urobilinogen, Urine 0.20 0.0 - 1.0 eu/dl Nitrite, Urine Negative NEGATIVE    Leukocyte Clumps, Urine Negative NEGATIVE    Color, Urine Yellow YELLOW-STRAW    Character, Urine Clear CLR-SL.CLOUD       Lab Results   Component Value Date    CREATININE 1.0 08/13/2020    BUN 14 08/13/2020     08/13/2020    K 3.8 08/13/2020     08/13/2020    CO2 26 08/13/2020       Lab Results   Component Value Date    PSA 1.44 (H) 05/17/2021         Plan:    Low Back Pain/Perineal Pain- Symptomatic but improved. Patient reports mild improvement only with initiation of pelvic floor therapy. Patient does report radicular pain. MRI of the lumbar spine with no pathology that would explain his current symptomatology. Pelvic XR demonstrated mild degenerative changes in each hip (right>left). Recommend MRI of the pelvis. Patient would like to hold at this time. Due to intermittent, long-standing irritative symptomatology, he was recently treated with a six-week course of Bactrim DS for presumed prostatitis. Some improvement noted. Recommend starting Motrin 600 mg po twice daily and Neurontin 100 mg po twice daily. Patient will call with status report within the next two weeks. Call immediately worsening irritative and obstructive symptomatology, fever/chills, gross hematuria, etc.     Microscopic Hematuria- Urine dip positive for large blood. CT imaging and office cystoscopy will be needed. Will schedule at next appointment.      Prostate Cancer Screening- PSA (5/15/21) is 1.44. No other values for comparison.

## 2021-07-15 ENCOUNTER — OFFICE VISIT (OUTPATIENT)
Dept: UROLOGY | Age: 51
End: 2021-07-15
Payer: COMMERCIAL

## 2021-07-15 VITALS
DIASTOLIC BLOOD PRESSURE: 78 MMHG | HEIGHT: 72 IN | WEIGHT: 164.9 LBS | BODY MASS INDEX: 22.34 KG/M2 | SYSTOLIC BLOOD PRESSURE: 130 MMHG

## 2021-07-15 DIAGNOSIS — N41.1 PROSTATITIS, CHRONIC: Primary | ICD-10-CM

## 2021-07-15 LAB
BILIRUBIN URINE: ABNORMAL
BLOOD URINE, POC: ABNORMAL
CHARACTER, URINE: CLEAR
COLOR, URINE: YELLOW
GLUCOSE URINE: NEGATIVE MG/DL
KETONES, URINE: ABNORMAL
LEUKOCYTE CLUMPS, URINE: NEGATIVE
NITRITE, URINE: NEGATIVE
PH, URINE: 5.5 (ref 5–9)
PROTEIN, URINE: 30 MG/DL
SPECIFIC GRAVITY, URINE: >= 1.03 (ref 1–1.03)
UROBILINOGEN, URINE: 0.2 EU/DL (ref 0–1)

## 2021-07-15 PROCEDURE — 99213 OFFICE O/P EST LOW 20 MIN: CPT | Performed by: PHYSICIAN ASSISTANT

## 2021-07-15 PROCEDURE — 1036F TOBACCO NON-USER: CPT | Performed by: PHYSICIAN ASSISTANT

## 2021-07-15 PROCEDURE — G8427 DOCREV CUR MEDS BY ELIG CLIN: HCPCS | Performed by: PHYSICIAN ASSISTANT

## 2021-07-15 PROCEDURE — G8420 CALC BMI NORM PARAMETERS: HCPCS | Performed by: PHYSICIAN ASSISTANT

## 2021-07-15 PROCEDURE — 81003 URINALYSIS AUTO W/O SCOPE: CPT | Performed by: PHYSICIAN ASSISTANT

## 2021-07-15 RX ORDER — GABAPENTIN 100 MG/1
100 CAPSULE ORAL 2 TIMES DAILY
Qty: 60 CAPSULE | Refills: 5 | Status: SHIPPED | OUTPATIENT
Start: 2021-07-15 | End: 2021-09-15

## 2021-07-20 NOTE — DISCHARGE SUMMARY
[facility] DISCHARGE NOTE  OUTPATIENT  Specialized Therapy Services    Patient Name: Eve Meyers        CSN: [id number]   YOB: 1970  Gender: male  ALYSSIA Martinez*,    Low back pain [M54.5]  Other chronic pain [G89.29] ,      Patient is discharged from Physical Therapy services at this time. See last note for details related to results of therapy and goal achievement. Reason for discharge: Was on hold for physician appointment. No additional therapy required at this time. Pt has not contacted clinic since 3/31/2021 with questions or the need for more therapy.       Radha Henley, PT 7/20/2021

## 2021-07-28 ENCOUNTER — TELEPHONE (OUTPATIENT)
Dept: UROLOGY | Age: 51
End: 2021-07-28

## 2021-07-28 DIAGNOSIS — N41.9 PROSTATITIS, UNSPECIFIED PROSTATITIS TYPE: Primary | ICD-10-CM

## 2021-07-28 RX ORDER — SULFAMETHOXAZOLE AND TRIMETHOPRIM 800; 160 MG/1; MG/1
1 TABLET ORAL 2 TIMES DAILY
Qty: 84 TABLET | Refills: 0 | Status: SHIPPED | OUTPATIENT
Start: 2021-07-28 | End: 2021-09-08

## 2021-07-28 NOTE — TELEPHONE ENCOUNTER
He would like to restart the antibiotics and on wait on repeating urine. Please schedule the MRI at [facility]. He will think about which way he wants the cystoscopy scheduled and will let [facility] know when he is contacted regarding the MRI appointment date and time. He has a GI appointment on 08/03/2021. Thank you.

## 2021-07-28 NOTE — TELEPHONE ENCOUNTER
Will start Bactrim DS one po every 12 hours x 6 weeks for prostatitis. Patient needs to take probiotics or drink Kefir daily. Will call patient with results of studies.

## 2021-07-28 NOTE — TELEPHONE ENCOUNTER
Patient tried the gabapentin for 1 to 11/2 weeks then stopped it because he started having issues with swallowing and some loose stools. He was able to swallow food ok but felt like it was tight and had some inflammation in the esophagus. He does not think the loose stools was necessarily related to the gabapentin. Thank you.

## 2021-07-28 NOTE — TELEPHONE ENCOUNTER
Patient advised Bactrim was sent to the pharmacy. He voiced understanding and stated he takes a probiotic but will also get kefir.

## 2021-07-28 NOTE — TELEPHONE ENCOUNTER
Please schedule MRI of the pelvis with and without contrast (order in Epic) at [facility] in their open MRI scanner if possible. Patient's urine was checked at his last appointment and there was no sign of infection. We certainly can check his urine again if he thinks worsening symptomatology has occurred since this time. If he would like to start another long course of antibiotics, that can be ordered as well. Prostatitis doesn't always declare itself with a urinary tract infection. He has had a small amount of microscopic blood since his initial office visit. I think we should consider performing an office cystoscopy to take a look inside his lower urinary tract to rule out ulcerations and lesions that may be causing his discomfort as well. This could be arranged with Dr. Samir Elmore. If he does not feel that he could undergo this procedure in the office, it can be arranged under anesthesia if necessary.

## 2021-08-09 ENCOUNTER — TELEPHONE (OUTPATIENT)
Dept: UROLOGY | Age: 51
End: 2021-08-09

## 2021-08-19 ENCOUNTER — TELEPHONE (OUTPATIENT)
Dept: UROLOGY | Age: 51
End: 2021-08-19

## 2021-08-19 NOTE — TELEPHONE ENCOUNTER
Please schedule patient with Dr. Kathryn Johnson for an office cystoscopy secondary to chronic scrotal pain and urinary issues.

## 2021-09-15 ENCOUNTER — PROCEDURE VISIT (OUTPATIENT)
Dept: UROLOGY | Age: 51
End: 2021-09-15
Payer: COMMERCIAL

## 2021-09-15 VITALS — WEIGHT: 160 LBS | BODY MASS INDEX: 21.67 KG/M2 | HEIGHT: 72 IN | RESPIRATION RATE: 18 BRPM

## 2021-09-15 DIAGNOSIS — R10.2 PERINEAL PAIN: ICD-10-CM

## 2021-09-15 DIAGNOSIS — R31.29 MICROHEMATURIA: ICD-10-CM

## 2021-09-15 DIAGNOSIS — N41.9 PROSTATITIS, UNSPECIFIED PROSTATITIS TYPE: Primary | ICD-10-CM

## 2021-09-15 LAB
BILIRUBIN URINE: NEGATIVE
BLOOD URINE, POC: ABNORMAL
CHARACTER, URINE: CLEAR
COLOR, URINE: YELLOW
GLUCOSE URINE: NEGATIVE MG/DL
KETONES, URINE: NEGATIVE
LEUKOCYTE CLUMPS, URINE: NEGATIVE
NITRITE, URINE: NEGATIVE
PH, URINE: 7 (ref 5–9)
PROTEIN, URINE: NEGATIVE MG/DL
SPECIFIC GRAVITY, URINE: 1.01 (ref 1–1.03)
UROBILINOGEN, URINE: 0.2 EU/DL (ref 0–1)

## 2021-09-15 PROCEDURE — 99213 OFFICE O/P EST LOW 20 MIN: CPT | Performed by: UROLOGY

## 2021-09-15 PROCEDURE — G8427 DOCREV CUR MEDS BY ELIG CLIN: HCPCS | Performed by: UROLOGY

## 2021-09-15 PROCEDURE — G8420 CALC BMI NORM PARAMETERS: HCPCS | Performed by: UROLOGY

## 2021-09-15 PROCEDURE — 52000 CYSTOURETHROSCOPY: CPT | Performed by: UROLOGY

## 2021-09-15 PROCEDURE — 81003 URINALYSIS AUTO W/O SCOPE: CPT | Performed by: UROLOGY

## 2021-09-15 PROCEDURE — 1036F TOBACCO NON-USER: CPT | Performed by: UROLOGY

## 2021-09-15 RX ORDER — CIPROFLOXACIN 500 MG/1
500 TABLET, FILM COATED ORAL 2 TIMES DAILY
Qty: 180 TABLET | Refills: 0 | Status: SHIPPED | OUTPATIENT
Start: 2021-09-15 | End: 2021-12-16 | Stop reason: SDUPTHER

## 2021-09-15 RX ORDER — GABAPENTIN 100 MG/1
100 CAPSULE ORAL 2 TIMES DAILY
Qty: 180 CAPSULE | Refills: 1 | Status: SHIPPED | OUTPATIENT
Start: 2021-09-15 | End: 2021-12-16 | Stop reason: SDUPTHER

## 2021-09-15 NOTE — PROGRESS NOTES
MD Reyes Ellis [address] [place] [address].  [address]  [facility] [address]  Dept: [phone]  Dept Fax: [phone]: [address] [address] Urology Office Note -     Patient: Darshana Cosme  YOB: 1970    The patient is a 53 y.o. male who presents today for evaluation of the following problems:   Chief Complaint   Patient presents with    Follow-up     cysto today for perineal pain and prostatitis         HISTORY OF PRESENT ILLNESS:     Chronic pelvic pain syndrome  Multiple long abx courses  Gabapentin/flomax/doxy in past  Pelvic floor therapy tried with limited success in past    microhematuria  Here for cystoscopy      Summary of Previous Records:  Plan:    Low Back Pain/Perineal Pain- Symptomatic but improved. Patient reports mild improvement only with initiation of pelvic floor therapy. Patient does report radicular pain. MRI of the lumbar spine with no pathology that would explain his current symptomatology. Pelvic XR demonstrated mild degenerative changes in each hip (right>left). Recommend MRI of the pelvis. Patient would like to hold at this time. Due to intermittent, long-standing irritative symptomatology, he was recently treated with a six-week course of Bactrim DS for presumed prostatitis. Some improvement noted. Recommend starting Motrin 600 mg po twice daily and Neurontin 100 mg po twice daily. Patient will call with status report within the next two weeks. Call immediately worsening irritative and obstructive symptomatology, fever/chills, gross hematuria, etc.     Microscopic Hematuria- Urine dip positive for large blood. CT imaging and office cystoscopy will be needed. Will schedule at next appointment.      Prostate Cancer Screening- PSA (5/15/21) is 1.44. No other values for comparison.           Requested/reviewed records from Dawna Pike MD office and/or outside [de-identified]    (Patient's old records have been requested, reviewed and pertinent findings summarized in today's note.)    Procedures Today:         Cystoscopy Operative Note  Patient: Radha Kee  MRN: [id number]  YOB: 1970    Date: 09/15/21  Surgeon: Alexandra Walton MD  Anesthesia: Urethral 2% Xylocaine   Indications: pelvic pain/hematuria  Position: Supine    Findings:   The patient was prepped and draped in the usual sterile fashion. The flexible cystoscope was advanced through the urethra and into the bladder. The bladder was thoroughly inspected and the following was noted:    Residual Urine: Minimal  Urethra: No abnormalities of the urethra are noted. Prostate: mild enlargement no median lobe minimal obstruction  Bladder: No tumors or CIS noted. No bladder diverticulum. mild trabeculation noted. Ureters: Clear efflux from both ureters. Orifices with normal configuration and location. The cystoscope was removed. The patient tolerated the procedure well.       Last several PSA's:  Lab Results   Component Value Date    PSA 1.44 (H) 05/17/2021       Last total testosterone:  No results found for: TESTOSTERONE    Urinalysis today:  Results for POC orders placed in visit on 09/15/21   POCT Urinalysis No Micro (Auto)   Result Value Ref Range    Glucose, Ur Negative NEGATIVE mg/dl    Bilirubin Urine Negative     Ketones, Urine Negative NEGATIVE    Specific Gravity, Urine 1.015 1.002 - 1.030    Blood, UA POC Moderate (A) NEGATIVE    pH, Urine 7.00 5.0 - 9.0    Protein, Urine Negative NEGATIVE mg/dl    Urobilinogen, Urine 0.20 0.0 - 1.0 eu/dl    Nitrite, Urine Negative NEGATIVE    Leukocyte Clumps, Urine Negative NEGATIVE    Color, Urine Yellow YELLOW-STRAW    Character, Urine Clear CLR-SL.CLOUD       Last BUN and creatinine:  Lab Results   Component Value Date    BUN 14 08/13/2020     Lab Results   Component Value Date    CREATININE 1.0 08/13/2020       Imaging Reviewed during this Office Visit: Renetta Hopper MD independently reviewed the images and verified the radiology reports from:    MRI Karol Mcrae 157    Result Date: 2021  Ordering Provider: Sandra Khan 69    Radiology Department  Patient:  Susan Coppola [place]. :  1970   Sex: Elias point, [address] [facility]  Location:  [place] [address]   Unit #:   [id number]    Acct #:  [[id number]]    Ordering Phys: Maximus MEADOWS      Exam Date: 21  Accession #:  [id number]  Exam:  MRI   MRI Lumbar Spine w/wo Contr  Result: See Report      STUDY:   MRI LUMBAR SPINE WITH AND WITHOUT CONTRAST    REASON FOR EXAM:   Male, 53years old. LOW BACK PAIN X 1 YR -- NO INJURY  -- NO SX    TECHNIQUE:   Standardized fat and water weighted pulse sequences were  obtained in the sagittal and axial planes. IV Gadavist 8 mL was  administered for the contrast portion of the examination. COMPARISON:   None  ___________________________________    FINDINGS:    T12-L1:  Normal endplates. Normal disc height, hydration and morphology. Normal bilateral facet joints. Normal central canal and bilateral lateral  recesses. Normal bilateral intervertebral neural foramina. Normal lumbar lordosis. There is no substantial scoliosis. Normal conus  medullaris that terminates at the L1/L2. Slitlike appearance to the  neural foramina suggestive of congenital spinal stenosis (short pedicles). L1-2:  Normal endplates. Normal disc height, hydration and morphology. Normal bilateral facet joints. Normal central canal and bilateral lateral  recesses. Normal bilateral intervertebral neural foramina. L2-3:  Some disc desiccation but no disc protrusion, spinal stenosis, or  neural foraminal stenosis. L3-4:  Normal endplates. Normal disc height, hydration and morphology. Normal bilateral facet joints. Normal central canal and bilateral lateral  recesses.   Normal bilateral intervertebral neural foramina. L4-5:  Normal endplates. Normal disc height, hydration and morphology. Normal bilateral facet joints. Normal central canal and bilateral lateral  recesses. Normal bilateral intervertebral neural foramina. L5-S1:  Normal endplates. Normal disc height, hydration and morphology. Normal bilateral facet joints. Normal central canal and bilateral lateral  recesses. Normal bilateral intervertebral neural foramina. Normal visualized sacral ala. Normal visualized paraspinous soft tissue structures. There is no demonstrated abnormal enhancement.  ___________________________________    IMPRESSION:  Suspect congenital spinal stenosis (short pedicles) but no superimposed  degenerative disc disease. Electronically Signed:  Swetha Paula MD  2021/05/18 at 17:59 EDT  Tel [phone], Service support  [phone], Fax [phone]          cc:  Katey MEADOWS;  Pete Tello MD      Dictated by:  Carly Rodrigez MD on 05/18/21 1759  Technologist:   RT(R)(MR) Colleen Perez  Transcribed by:  Carly Rodrigez MD on 05/18/21 1759    Report Signed by:  Texie Sacks on 05/18/21 1759      PAST MEDICAL, FAMILY AND SOCIAL HISTORY:  Past Medical History:   Diagnosis Date    Bowel trouble      Past Surgical History:   Procedure Laterality Date    COLONOSCOPY  2020     Family History   Problem Relation Age of Onset    Asthma Mother     COPD Mother     Other Father         aortic aneurysm    Stroke Paternal Grandfather      Outpatient Medications Marked as Taking for the 9/15/21 encounter (Procedure visit) with Richard Cordoba MD   Medication Sig Dispense Refill    UNKNOWN TO PATIENT Rectal ointment for possible hemorrhoid      ciprofloxacin (CIPRO) 500 MG tablet Take 1 tablet by mouth 2 times daily 180 tablet 0    ibuprofen (ADVIL;MOTRIN) 200 MG CAPS       acetaminophen (TYLENOL) 325 MG tablet Take 650 mg by mouth every 6 hours as needed for Pain      Probiotic

## 2021-12-16 ENCOUNTER — OFFICE VISIT (OUTPATIENT)
Dept: UROLOGY | Age: 51
End: 2021-12-16
Payer: COMMERCIAL

## 2021-12-16 VITALS
HEIGHT: 72 IN | BODY MASS INDEX: 23.03 KG/M2 | DIASTOLIC BLOOD PRESSURE: 78 MMHG | WEIGHT: 170 LBS | SYSTOLIC BLOOD PRESSURE: 134 MMHG

## 2021-12-16 DIAGNOSIS — R10.2 PERINEAL PAIN: ICD-10-CM

## 2021-12-16 DIAGNOSIS — N41.9 PROSTATITIS, UNSPECIFIED PROSTATITIS TYPE: Primary | ICD-10-CM

## 2021-12-16 LAB
BILIRUBIN URINE: NEGATIVE
BLOOD URINE, POC: ABNORMAL
CHARACTER, URINE: CLEAR
COLOR, URINE: YELLOW
GLUCOSE URINE: NEGATIVE MG/DL
KETONES, URINE: NEGATIVE
LEUKOCYTE CLUMPS, URINE: NEGATIVE
NITRITE, URINE: NEGATIVE
PH, URINE: 6 (ref 5–9)
PROTEIN, URINE: NEGATIVE MG/DL
SPECIFIC GRAVITY, URINE: 1.02 (ref 1–1.03)
UROBILINOGEN, URINE: 0.2 EU/DL (ref 0–1)

## 2021-12-16 PROCEDURE — G8420 CALC BMI NORM PARAMETERS: HCPCS | Performed by: PHYSICIAN ASSISTANT

## 2021-12-16 PROCEDURE — 81003 URINALYSIS AUTO W/O SCOPE: CPT | Performed by: PHYSICIAN ASSISTANT

## 2021-12-16 PROCEDURE — G8484 FLU IMMUNIZE NO ADMIN: HCPCS | Performed by: PHYSICIAN ASSISTANT

## 2021-12-16 PROCEDURE — 3017F COLORECTAL CA SCREEN DOC REV: CPT | Performed by: PHYSICIAN ASSISTANT

## 2021-12-16 PROCEDURE — 1036F TOBACCO NON-USER: CPT | Performed by: PHYSICIAN ASSISTANT

## 2021-12-16 PROCEDURE — G8427 DOCREV CUR MEDS BY ELIG CLIN: HCPCS | Performed by: PHYSICIAN ASSISTANT

## 2021-12-16 PROCEDURE — 99213 OFFICE O/P EST LOW 20 MIN: CPT | Performed by: PHYSICIAN ASSISTANT

## 2021-12-16 RX ORDER — GABAPENTIN 100 MG/1
100 CAPSULE ORAL 3 TIMES DAILY
Qty: 90 CAPSULE | Refills: 5 | Status: SHIPPED | OUTPATIENT
Start: 2021-12-16 | End: 2022-08-08

## 2021-12-16 RX ORDER — CYCLOBENZAPRINE HCL 5 MG
5 TABLET ORAL 2 TIMES DAILY PRN
Qty: 30 TABLET | Refills: 5 | Status: SHIPPED | OUTPATIENT
Start: 2021-12-16 | End: 2021-12-26

## 2021-12-16 RX ORDER — CIPROFLOXACIN 500 MG/1
500 TABLET, FILM COATED ORAL 2 TIMES DAILY
Qty: 180 TABLET | Refills: 0 | Status: SHIPPED | OUTPATIENT
Start: 2021-12-16 | End: 2022-03-16

## 2021-12-16 NOTE — PROGRESS NOTES
[address] [address] [place] [address] [place] [address]  Dept: [phone]  Loc: [phone]      Mr. Edwina Suazo was seen in follow up for   Chief Complaint   Patient presents with    Follow-up     prostatitis         HPI:  Mr. Edwina Suazo is a 81-year-old male with a several month history of left lower back/sciatic pain and deep, sharp pain radiating to the perianal and perineal areas. He has taken multiple rounds of four to six-week courses of antibiotics with worsening of his symptoms when the antibiotics were complete. He underwent several sessions of pelvic floor and low back/hip physical therapy with Zainab Soni with minimal effectiveness per patient. Neurontin was initiated, by he did not feel there was much benefit to the medication. 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 He underwent an office cystoscopy with Dr. Noemy Landaverde on 9/15/21. Procedural findings included mild enlargement of the median lobe of the prostate with mild trabeculation noted. He was started on a three-month course of Ciprofloxacin. He presents today for further evaluation. Past Medical History:   Diagnosis Date    Bowel trouble        Past Surgical History:   Procedure Laterality Date    COLONOSCOPY  2020       Current Outpatient Medications on File Prior to Visit   Medication Sig Dispense Refill    Probiotic Product (PROBIOTIC ADVANCED PO) Take by mouth daily       UNKNOWN TO PATIENT Rectal ointment for possible hemorrhoid      ibuprofen (ADVIL;MOTRIN) 200 MG CAPS       acetaminophen (TYLENOL) 325 MG tablet Take 650 mg by mouth every 6 hours as needed for Pain       No current facility-administered medications on file prior to visit.        Allergies   Allergen Reactions    Doxycycline Rash     Edema in eyelids       Family History   Problem Relation Age of Onset    Asthma Mother     COPD Mother     Other Father         aortic aneurysm    Stroke congestion and trouble swallowing. Eyes: Negative for pain and itching. Respiratory: Negative for cough and shortness of breath. Cardiovascular: Negative for chest pain and leg swelling. Gastrointestinal: Negative for abdominal pain, constipation, diarrhea and nausea. Endocrine: Negative for cold intolerance and heat intolerance. Genitourinary: See HPI. Musculoskeletal: Positive for arthralgias. Negative for back pain and joint swelling. Skin: Negative for rash. Neurological: Negative for dizziness, weakness, numbness and headaches. Psychiatric/Behavioral: The patient is not nervous/anxious. Exam    /78   Ht 5' 11.5\" (1.816 m)   Wt 170 lb (77.1 kg)   BMI 23.38 kg/m²     Constitutional: Oriented to person, place, and time. Vital signs are normal. Appears well-developed and well-nourished. Cooperative. No distress. HENT:    Head: Normocephalic and atraumatic.    Eyes: EOM are normal. Pupils are equal, round, and reactive to light. Right eye exhibits no discharge. Left eye exhibits no discharge. No scleral icterus. Neck: Trachea normal. No JVD present.    Cardiovascular: Normal rate and regular rhythm. S1and S2 normal.  Pulmonary/Chest: Effort normal. No respiratory distress. No wheezes, rhonchi, or rales. Abdominal: Soft. Exhibits no generalized tenderness or distention. There is no rebound, rigidity, or guarding. No CVA tenderness. Lymphadenopathy: No superficial cervical or supraclavicular lymphadenopathy. Neurological: Alert and oriented to person, place, and time. No cranial nerve deficit. Skin: Skin is warm and dry. Not diaphoretic.    Psychiatric: Normal mood and affect. Behavior is normal.   Nursing note and vitals reviewed    Labs    Results for POC orders placed in visit on 12/16/21   POCT Urinalysis No Micro (Auto)   Result Value Ref Range    Glucose, Ur Negative NEGATIVE mg/dl    Bilirubin Urine Negative     Ketones, Urine Negative NEGATIVE    Specific Gravity, Urine 1.025 1.002 - 1.030    Blood, UA POC Moderate (A) NEGATIVE    pH, Urine 6.00 5.0 - 9.0    Protein, Urine Negative NEGATIVE mg/dl    Urobilinogen, Urine 0.20 0.0 - 1.0 eu/dl    Nitrite, Urine Negative NEGATIVE    Leukocyte Clumps, Urine Negative NEGATIVE    Color, Urine Yellow YELLOW-STRAW    Character, Urine Clear CLR-SL.CLOUD       Lab Results   Component Value Date    CREATININE 1.0 08/13/2020    BUN 14 08/13/2020     08/13/2020    K 3.8 08/13/2020     08/13/2020    CO2 26 08/13/2020       Lab Results   Component Value Date    PSA 1.44 (H) 05/17/2021         Assessment/Plan:    Low Back Pain/Perineal Pain- Symptomatic but somewhat improved on recent addition of three-month Cipro therapy. Patient reported minimal to mild improvement only with initiation of pelvic floor therapy. Patient does report radicular pain. MRI of the lumbar spine with no pathology that would explain his current symptomatology. Pelvic XR demonstrated mild degenerative changes in each hip (right>left).  Recommend MRI of the pelvis. Patient would like to hold at this time. Continue Neurontin 100 mg po twice daily. Add Flexeril. Patient will call with status report. Call immediately worsening irritative and obstructive symptomatology, fever/chills, gross hematuria, etc.     Microscopic Hematuria- Urine dip positive for blood. Urine cytology from May 2020 was negative for atypical cells. Office cystoscopy was negative. Continue to monitor.      Prostate Cancer Screening- PSA (5/15/21) is 1.44. No other values for comparison.

## 2022-03-20 NOTE — PROGRESS NOTES
[address] [address] [place] [address]  Dept: [phone]  Loc: [phone]      Mr. Leny Mcmanus was seen in follow up for   Chief Complaint   Patient presents with    Follow-up     prostatitis/perineal pain         HPI:  Mr. Leny Mcmanus is a 78-year-old male with a several month history of left lower back/sciatic pain and deep, sharp pain radiating to the perianal and perineal areas. He has taken multiple rounds of four to six-week courses of antibiotics with worsening of his symptoms when the antibiotics were complete. He underwent several sessions of pelvic floor and low back/hip physical therapy with Radha Henley with minimal effectiveness per patient. Neurontin was initiated, which he is taking twice daily with some effectiveness noted. 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 He underwent an office cystoscopy with Dr. María Elena Judd on 9/15/21. Procedural findings included mild enlargement of the median lobe of the prostate with mild trabeculation noted. He was started on a three-month course of Ciprofloxacin. He states that he has noted improvement in his pelvic pressure and some of his irritative urinary symptoms with the extended course of Cipro, but he still is experiencing perineal and perianal pain. He describes this pain as almost a heat/burning sensation. This radicular pain emminates from bilateral hip and lower back/sacral area to the perineum and perianal area. He does notice this more with a full bladder. He notices some discomfort with sexual activity. He is seeing GI for perianal discomfort and diarrhea. He presents today for further evaluation.      Past Medical History:   Diagnosis Date    Bowel trouble        Past Surgical History:   Procedure Laterality Date    COLONOSCOPY  2020    SIGMOIDOSCOPY  2021       Current Outpatient Medications on File Prior to Visit   Medication Sig Dispense Refill    ciprofloxacin (CIPRO) 500 MG tablet Take 500 mg by mouth 2 times daily      gabapentin (NEURONTIN) 100 MG capsule Take 1 capsule by mouth 3 times daily for 30 days. Intended supply: 90 days (Patient taking differently: Take 100 mg by mouth in the morning and at bedtime. Intended supply: 90 days) 90 capsule 5    ibuprofen (ADVIL;MOTRIN) 200 MG CAPS       acetaminophen (TYLENOL) 325 MG tablet Take 650 mg by mouth every 6 hours as needed for Pain      Probiotic Product (PROBIOTIC ADVANCED PO) Take by mouth daily        No current facility-administered medications on file prior to visit. Allergies   Allergen Reactions    Doxycycline Rash     Edema in eyelids       Family History   Problem Relation Age of Onset    Asthma Mother     COPD Mother     Other Father         aortic aneurysm    Stroke Paternal Grandfather        Social History     Socioeconomic History    Marital status:      Spouse name: Not on file    Number of children: Not on file    Years of education: Not on file    Highest education level: Not on file   Occupational History    Not on file   Tobacco Use    Smoking status: Never Smoker    Smokeless tobacco: Never Used   Substance and Sexual Activity    Alcohol use: No    Drug use: No    Sexual activity: Yes     Partners: Female   Other Topics Concern    Not on file   Social History Narrative    Not on file     Social Determinants of Health     Financial Resource Strain:     Difficulty of Paying Living Expenses: Not on file   Food Insecurity:     Worried About Running Out of Food in the Last Year: Not on file    Angie of Food in the Last Year: Not on file   Transportation Needs:     Lack of Transportation (Medical): Not on file    Lack of Transportation (Non-Medical):  Not on file   Physical Activity:     Days of Exercise per Week: Not on file    Minutes of Exercise per Session: Not on file   Stress:     Feeling of Stress : Not on file   Social Connections:     Frequency of Communication with Friends and Family: Not on file    Frequency of Social Gatherings with Friends and Family: Not on file    Attends Jehovah's witness Services: Not on file    Active Member of Clubs or Organizations: Not on file    Attends Club or Organization Meetings: Not on file    Marital Status: Not on file   Intimate Partner Violence:     Fear of Current or Ex-Partner: Not on file    Emotionally Abused: Not on file    Physically Abused: Not on file    Sexually Abused: Not on file   Housing Stability:     Unable to Pay for Housing in the Last Year: Not on file    Number of [place] in the Last Year: Not on file    Unstable Housing in the Last Year: Not on file       Review of Systems  Constitutional: Negative for fatigue, fever and unexpected weight change. HENT: Negative for congestion and trouble swallowing.    Eyes: Negative for pain and itching. Respiratory: Negative for cough and shortness of breath.    Cardiovascular: Negative for chest pain and leg swelling. Gastrointestinal: Positive for diarrhea. Negative for abdominal pain, constipation, and nausea. Endocrine: Negative for cold intolerance and heat intolerance. Genitourinary: See HPI. Musculoskeletal:  Negative for back pain and joint swelling. Skin: Negative for rash. Neurological: Negative for dizziness, weakness, numbness and headaches. Psychiatric/Behavioral: The patient is not nervous/anxious.      Exam    /70   Ht 5' 11.5\" (1.816 m)   Wt 166 lb (75.3 kg)   BMI 22.83 kg/m²      Constitutional: Oriented to person, place, and time. Vital signs are normal. Appears well-developed and well-nourished. Cooperative. No distress. HENT:    Head: Normocephalic and atraumatic.    Eyes: EOM are normal. Pupils are equal, round, and reactive to light. Right eye exhibits no discharge. Left eye exhibits no discharge. No scleral icterus.    Neck: Trachea normal. No JVD present.    Cardiovascular: Normal rate and regular rhythm. S1and S2 normal.  Pulmonary/Chest: Effort normal. No respiratory distress. No wheezes, rhonchi, or rales. Abdominal: Soft. Exhibits no generalized tenderness or distention. There is no rebound, rigidity, or guarding. No CVA tenderness. Lymphadenopathy: No superficial cervical or supraclavicular lymphadenopathy. Neurological: Alert and oriented to person, place, and time. No cranial nerve deficit. Skin: Skin is warm and dry. Not diaphoretic. Psychiatric: Normal mood and affect. Behavior is normal.   Nursing note and vitals reviewed    Labs    Results for POC orders placed in visit on 03/21/22   POCT Urinalysis No Micro (Auto)   Result Value Ref Range    Glucose, Ur Negative NEGATIVE mg/dl    Bilirubin Urine Negative     Ketones, Urine Negative NEGATIVE    Specific Gravity, Urine >= 1.030 1.002 - 1.030    Blood, UA POC Moderate (A) NEGATIVE    pH, Urine 5.50 5.0 - 9.0    Protein, Urine Negative NEGATIVE mg/dl    Urobilinogen, Urine 0.20 0.0 - 1.0 eu/dl    Nitrite, Urine Negative NEGATIVE    Leukocyte Clumps, Urine Negative NEGATIVE    Color, Urine Yellow YELLOW-STRAW    Character, Urine Clear [id number].CLOUD       Lab Results   Component Value Date    CREATININE 1.0 08/13/2020    BUN 14 08/13/2020     08/13/2020    K 3.8 08/13/2020     08/13/2020    CO2 26 08/13/2020       Lab Results   Component Value Date    PSA 1.44 (H) 05/17/2021         Assessment/Plan:    Low Back /Perineal/Perianal  Pain- Symptomatic but somewhat improved on recent addition of three-month Cipro therapy. Patient reported minimal to mild improvement only with initiation of pelvic floor therapy. Patient does report radicular pain from the bilateral hips and sacrum to the perineal/perianal area. A MRI of the lumbar spine was obtained. He does have what appears to be congenital spinal stenosis. However, this does not fit with perineal/perianal pain. Pelvic XR demonstrated mild degenerative changes in each hip (right>left). PT physical examination revealed restricted left external hip rotation and restricted right internal hip rotation. Recommend MRI of the sacrum and hips. Concerned with pudendal nerve entrapment. Continue Neurontin 100 mg po twice daily. Call immediately worsening irritative and obstructive symptomatology, fever/chills, gross hematuria, etc.     Microscopic Hematuria- Urine dip positive for blood. Urine cytology from May 2020 was negative for atypical cells. Office cystoscopy was negative (9/21). Will obtain urine cytology today. If positive, we will proceed with Bladder Cx.       Prostate Cancer Screening- PSA (5/15/21) is 1.44.  Will need repeat value in May 2022.

## 2022-03-21 ENCOUNTER — OFFICE VISIT (OUTPATIENT)
Dept: UROLOGY | Age: 52
End: 2022-03-21
Payer: COMMERCIAL

## 2022-03-21 VITALS
BODY MASS INDEX: 22.48 KG/M2 | WEIGHT: 166 LBS | DIASTOLIC BLOOD PRESSURE: 70 MMHG | SYSTOLIC BLOOD PRESSURE: 134 MMHG | HEIGHT: 72 IN

## 2022-03-21 DIAGNOSIS — R31.29 HEMATURIA, MICROSCOPIC: ICD-10-CM

## 2022-03-21 DIAGNOSIS — K62.89 PERIANAL PAIN: ICD-10-CM

## 2022-03-21 DIAGNOSIS — Z12.5 PROSTATE CANCER SCREENING: ICD-10-CM

## 2022-03-21 DIAGNOSIS — N41.9 PROSTATITIS, UNSPECIFIED PROSTATITIS TYPE: Primary | ICD-10-CM

## 2022-03-21 DIAGNOSIS — R10.2 PERINEAL PAIN: ICD-10-CM

## 2022-03-21 LAB
BILIRUBIN URINE: NEGATIVE
BLOOD URINE, POC: ABNORMAL
CHARACTER, URINE: CLEAR
COLOR, URINE: YELLOW
GLUCOSE URINE: NEGATIVE MG/DL
KETONES, URINE: NEGATIVE
LEUKOCYTE CLUMPS, URINE: NEGATIVE
NITRITE, URINE: NEGATIVE
PH, URINE: 5.5 (ref 5–9)
PROTEIN, URINE: NEGATIVE MG/DL
SPECIFIC GRAVITY, URINE: >= 1.03 (ref 1–1.03)
UROBILINOGEN, URINE: 0.2 EU/DL (ref 0–1)

## 2022-03-21 PROCEDURE — G8427 DOCREV CUR MEDS BY ELIG CLIN: HCPCS | Performed by: PHYSICIAN ASSISTANT

## 2022-03-21 PROCEDURE — 3017F COLORECTAL CA SCREEN DOC REV: CPT | Performed by: PHYSICIAN ASSISTANT

## 2022-03-21 PROCEDURE — 81003 URINALYSIS AUTO W/O SCOPE: CPT | Performed by: PHYSICIAN ASSISTANT

## 2022-03-21 PROCEDURE — 1036F TOBACCO NON-USER: CPT | Performed by: PHYSICIAN ASSISTANT

## 2022-03-21 PROCEDURE — G8484 FLU IMMUNIZE NO ADMIN: HCPCS | Performed by: PHYSICIAN ASSISTANT

## 2022-03-21 PROCEDURE — 99213 OFFICE O/P EST LOW 20 MIN: CPT | Performed by: PHYSICIAN ASSISTANT

## 2022-03-21 PROCEDURE — G8420 CALC BMI NORM PARAMETERS: HCPCS | Performed by: PHYSICIAN ASSISTANT

## 2022-03-21 RX ORDER — CIPROFLOXACIN 500 MG/1
500 TABLET, FILM COATED ORAL 2 TIMES DAILY
COMMUNITY
End: 2022-03-30

## 2022-03-23 ENCOUNTER — TELEPHONE (OUTPATIENT)
Dept: UROLOGY | Age: 52
End: 2022-03-23

## 2022-03-23 NOTE — TELEPHONE ENCOUNTER
Please let Mr. Jericho Paredes know there are a few atypical cells on his urine cytology. His office cystoscopy in September did not visualize any gross abnormality of the bladder. We have two options. The first option is that we can simply repeat a urine cytology in six weeks to see if the atypical cells persist. Sometimes they are just caused by inflammation and once the inflammation clears, the abnormal cells are no longer present. The second option is to have him submit a urine sample for Bladder Cx testing. It is a genetic test that looks for five biomarkers in the urine that are present in higher levels in bladder cancer. Many insurance plans do not cover this so there may be an out of pocket expense for the testing. I believe the maximum a patient can be charged is $300. It is based upon yearly income. If he is interested, we can give him the 1-800 number to find out what his out-of-pocket expense would be.

## 2022-03-23 NOTE — TELEPHONE ENCOUNTER
Voicemail left for patient stating my chart message was sent with recommendations and to return the call for any questions.

## 2022-03-24 ENCOUNTER — TELEPHONE (OUTPATIENT)
Dept: UROLOGY | Age: 52
End: 2022-03-24

## 2022-03-24 DIAGNOSIS — R10.2 PERINEAL PAIN: Primary | ICD-10-CM

## 2022-03-25 ENCOUNTER — TELEPHONE (OUTPATIENT)
Dept: UROLOGY | Age: 52
End: 2022-03-25

## 2022-03-25 NOTE — TELEPHONE ENCOUNTER
Prior Auth started for MRI Left Hip and MRI Right Hip; case # [id number]    Authorization received for MRI left and right hip.

## 2022-03-28 ENCOUNTER — TELEPHONE (OUTPATIENT)
Dept: UROLOGY | Age: 52
End: 2022-03-28

## 2022-03-28 NOTE — TELEPHONE ENCOUNTER
Patient scheduled for MRI LEFT HIP and MRI RIGHT HIP  at [place] [address] on 3/30/22 ARRIVAL OF 715AM FOR A 730 AM SCAN. Patient advised of instructions.   Order mailed/given to patient

## 2022-03-30 ENCOUNTER — NURSE ONLY (OUTPATIENT)
Dept: UROLOGY | Age: 52
End: 2022-03-30

## 2022-03-30 DIAGNOSIS — R31.29 HEMATURIA, MICROSCOPIC: Primary | ICD-10-CM

## 2022-03-30 PROCEDURE — 99999 PR OFFICE/OUTPT VISIT,PROCEDURE ONLY: CPT | Performed by: NURSE PRACTITIONER

## 2022-03-31 ENCOUNTER — TELEPHONE (OUTPATIENT)
Dept: UROLOGY | Age: 52
End: 2022-03-31

## 2022-04-01 ENCOUNTER — TELEPHONE (OUTPATIENT)
Dept: UROLOGY | Age: 52
End: 2022-04-01

## 2022-04-01 NOTE — TELEPHONE ENCOUNTER
Patient's MRI of Right and Left Hip are scanned into his chart. The MRI of the Sacrum is scheduled for 4/5/22 at [facility].

## 2022-04-04 NOTE — TELEPHONE ENCOUNTER
Spoke with patient regarding the third scan being scheduled. He was not upset but was questioning why all three were not done at the same time. I told him we had pre auth'd and scheduled the two we had been given and then two days following we pre auth'd and scheduled the third one. I apologized that he had to have another appt. He just questioned again why they were not all three done at the same time. I did tell him that we talked to Vinita Burr to double check on having the scan done and explained the importance.

## 2022-04-12 ENCOUNTER — HOSPITAL ENCOUNTER (OUTPATIENT)
Dept: MRI IMAGING | Age: 52
Discharge: HOME | End: 2022-04-12

## 2022-04-12 ENCOUNTER — TELEPHONE (OUTPATIENT)
Dept: UROLOGY | Age: 52
End: 2022-04-12

## 2022-04-12 DIAGNOSIS — Z00.6 ENCOUNTER FOR EXAMINATION FOR NORMAL COMPARISON AND CONTROL IN CLINICAL RESEARCH PROGRAM: ICD-10-CM

## 2022-04-12 NOTE — TELEPHONE ENCOUNTER
Pt was recently in our clinic seeing Christopher Alejo PA-C. Three orders were placed by provider; however, only two were printed at checkout for our surgery scheduler to schedule therefore only two was done. Third order was needed as it was essential to his care. Called patient and explained what happened. He did get the third scan done at a later date. Spoke with radiology to confirm there was no additional charge by having the imaging done in two different sessions. Pt voiced understanding and was appreciative of the call. Hortensia Nieto will call [facility] and have all three images pushed [facility] so [facility] can review the films. [facility] will touch base with patient after reviewing films.

## 2022-05-03 ENCOUNTER — TELEPHONE (OUTPATIENT)
Dept: UROLOGY | Age: 52
End: 2022-05-03

## 2022-05-03 NOTE — TELEPHONE ENCOUNTER
Mr. Nealile Rene and I have discussed his MRI of the hip and pelvis in early April. We discussed possible referral to Ortho Neuro.  Will you please check with  Austin López to see if he would like us to make a referral.

## 2022-05-08 ENCOUNTER — TELEPHONE (OUTPATIENT)
Dept: UROLOGY | Age: 52
End: 2022-05-08

## 2022-05-08 DIAGNOSIS — N41.1 PROSTATITIS, CHRONIC: Primary | ICD-10-CM

## 2022-05-08 DIAGNOSIS — R10.2 PERINEAL PAIN: ICD-10-CM

## 2022-05-09 NOTE — TELEPHONE ENCOUNTER
Fer Hunt and I have discussed his MRI of the hip and pelvis in early April. We discussed possible referral to Ortho Neuro.  Will you please check with . Fer Hunt to see if he would like us to make a referral.

## 2022-05-09 NOTE — TELEPHONE ENCOUNTER
Patient would like to wait until after he has his labs drawn towards the end of month before the referral made. He will discuss it more with you at that time.     Thank you

## 2022-05-23 ENCOUNTER — HOSPITAL ENCOUNTER (OUTPATIENT)
Age: 52
Discharge: HOME | End: 2022-05-23
Payer: COMMERCIAL

## 2022-05-23 DIAGNOSIS — Z12.5 PROSTATE CANCER SCREENING: ICD-10-CM

## 2022-05-23 LAB — PROSTATE SPECIFIC ANTIGEN: 1.53 NG/ML (ref 0–1)

## 2022-05-23 PROCEDURE — G0103 PSA SCREENING: HCPCS

## 2022-05-23 PROCEDURE — 36415 COLL VENOUS BLD VENIPUNCTURE: CPT

## 2022-05-31 NOTE — TELEPHONE ENCOUNTER
Patient stated the pelvic pain is about the same. He is not feeling the best today. COVID test came back negative. He is going to repeat the covid test tonight. He has body aches, loss of appetite, fever was 102 then went down 101. He is using tylenol and pushing fluids. Advised to call the PCP and to be evaluated. He will have the PSA drawn prior to the appointment. He declined to change the appointment at this time but will call the office back if the pain increases to be seen sooner.

## 2022-05-31 NOTE — TELEPHONE ENCOUNTER
I would like him to have his PSA done before his next appointment. Please see how he is feeling. If his pelvic pain is increasing, we will need to move his appointment up to assess the need for more antibiotics.

## 2022-05-31 NOTE — TELEPHONE ENCOUNTER
Is Mr Cynthia Kaur having any respiratory symptoms? Is he having pain with urination or worsening urine stream? Did he call his PCP?

## 2022-06-01 NOTE — TELEPHONE ENCOUNTER
Patient is feeling better and the fever broke. He did not call his PCP. He denies respiratory symptoms and the other covid test was negative. He denies pain with urination or problems with the stream and the stream is pretty much normal. The general aches resolved and the fatigue is better. He is able to come 06/13/2022 but your schedule is full. You do not have any appointments open before his scheduled appointment on 06/20/2022. He feels the pain more when he is sitting versus standing.

## 2022-06-07 ENCOUNTER — HOSPITAL ENCOUNTER (OUTPATIENT)
Age: 52
Discharge: HOME | End: 2022-06-07
Payer: COMMERCIAL

## 2022-06-07 DIAGNOSIS — N41.1 PROSTATITIS, CHRONIC: ICD-10-CM

## 2022-06-07 DIAGNOSIS — R10.2 PERINEAL PAIN: ICD-10-CM

## 2022-06-07 LAB — PROSTATE SPECIFIC ANTIGEN: 1.52 NG/ML (ref 0–1)

## 2022-06-07 PROCEDURE — 36415 COLL VENOUS BLD VENIPUNCTURE: CPT

## 2022-06-07 PROCEDURE — 84153 ASSAY OF PSA TOTAL: CPT

## 2022-06-08 NOTE — TELEPHONE ENCOUNTER
If he is stable, we can keep him on 6/20. If his pain is worsening, we can put him at 11:15 on 6/13.

## 2022-06-13 ENCOUNTER — OFFICE VISIT (OUTPATIENT)
Dept: UROLOGY | Age: 52
End: 2022-06-13
Payer: COMMERCIAL

## 2022-06-13 VITALS
WEIGHT: 169 LBS | BODY MASS INDEX: 22.89 KG/M2 | SYSTOLIC BLOOD PRESSURE: 136 MMHG | HEIGHT: 72 IN | DIASTOLIC BLOOD PRESSURE: 76 MMHG

## 2022-06-13 DIAGNOSIS — R10.2 PELVIC PAIN: ICD-10-CM

## 2022-06-13 DIAGNOSIS — R31.29 MICROSCOPIC HEMATURIA: Primary | ICD-10-CM

## 2022-06-13 PROCEDURE — 99213 OFFICE O/P EST LOW 20 MIN: CPT | Performed by: PHYSICIAN ASSISTANT

## 2022-06-13 PROCEDURE — 3017F COLORECTAL CA SCREEN DOC REV: CPT | Performed by: PHYSICIAN ASSISTANT

## 2022-06-13 PROCEDURE — G8427 DOCREV CUR MEDS BY ELIG CLIN: HCPCS | Performed by: PHYSICIAN ASSISTANT

## 2022-06-13 PROCEDURE — 1036F TOBACCO NON-USER: CPT | Performed by: PHYSICIAN ASSISTANT

## 2022-06-13 PROCEDURE — G8420 CALC BMI NORM PARAMETERS: HCPCS | Performed by: PHYSICIAN ASSISTANT

## 2022-06-13 RX ORDER — METHYLPREDNISOLONE 4 MG/1
TABLET ORAL
Qty: 1 KIT | Refills: 0 | Status: SHIPPED | OUTPATIENT
Start: 2022-06-13 | End: 2022-06-19

## 2022-06-13 NOTE — PROGRESS NOTES
[address] [id number]  Dept: [phone]  Loc: [phone]      Mr. Charla Nugent was seen in follow up for   Chief Complaint   Patient presents with    Follow-up     prostatitis, perineal pain- labs prior         HPI:  Mr. Charla Nugent is a 71-year-old male with a several month history of left lower back/sciatic pain and deep, sharp pain radiating to the perianal and perineal areas. He has taken multiple rounds of four to six-week courses of antibiotics with worsening of his symptoms when the antibiotics were complete. He underwent several sessions of pelvic floor and low back/hip physical therapy with Delmer Amaya with minimal effectiveness per patient. Neurontin was initiated, which he is taking twice daily with some effectiveness noted. 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  He underwent an office cystoscopy with Dr. Gloria Briseno on 9/15/21. Procedural findings included mild enlargement of the median lobe of the prostate with mild trabeculation noted. He was started on a three-month course of Ciprofloxacin. He states that he has noted improvement in his pelvic pressure and some of his irritative urinary symptoms with the extended course of Cipro. Once the Cipro was discontinued, he noticed a mild, intermittent return to his perineal and perianal pain. He describes this pain as almost a heat/burning sensation. This radicular pain emminates from bilateral hip and lower back/sacral area to the perineum and perianal area. He does notice this more with a full bladder. He notices some discomfort with sexual activity. He has seen GI for his perianal discomfort and diarrhea. He presents today for further evaluation.      Past Medical History:   Diagnosis Date    Bowel trouble        Past Surgical History:   Procedure Laterality Date    COLONOSCOPY  2020    SIGMOIDOSCOPY  2021       Current Outpatient Medications on File Prior to Visit   Medication Sig Dispense Refill    gabapentin (NEURONTIN) 100 MG capsule Take 1 capsule by mouth 3 times daily for 30 days. Intended supply: 90 days (Patient taking differently: Take 100 mg by mouth in the morning and at bedtime. Intended supply: 90 days) 90 capsule 5    ibuprofen (ADVIL;MOTRIN) 200 MG CAPS       acetaminophen (TYLENOL) 325 MG tablet Take 650 mg by mouth every 6 hours as needed for Pain      Probiotic Product (PROBIOTIC ADVANCED PO) Take by mouth daily        No current facility-administered medications on file prior to visit. Allergies   Allergen Reactions    Doxycycline Rash     Edema in eyelids       Family History   Problem Relation Age of Onset    Asthma Mother     COPD Mother     Other Father         aortic aneurysm    Stroke Paternal Grandfather        Social History     Socioeconomic History    Marital status:      Spouse name: Not on file    Number of children: Not on file    Years of education: Not on file    Highest education level: Not on file   Occupational History    Not on file   Tobacco Use    Smoking status: Never Smoker    Smokeless tobacco: Never Used   Substance and Sexual Activity    Alcohol use: No    Drug use: No    Sexual activity: Yes     Partners: Female   Other Topics Concern    Not on file   Social History Narrative    Not on file     Social Determinants of Health     Financial Resource Strain:     Difficulty of Paying Living Expenses: Not on file   Food Insecurity:     Worried About Running Out of Food in the Last Year: Not on file    Angie of Food in the Last Year: Not on file   Transportation Needs:     Lack of Transportation (Medical): Not on file    Lack of Transportation (Non-Medical):  Not on file   Physical Activity:     Days of Exercise per Week: Not on file    Minutes of Exercise per Session: Not on file   Stress:     Feeling of Stress : Not on file   Social Connections:     Frequency of Communication with Friends and Family: Not on file    Frequency of Social Gatherings with Friends and Family: Not on file    Attends Sikhism Services: Not on file    Active Member of Clubs or Organizations: Not on file    Attends Club or Organization Meetings: Not on file    Marital Status: Not on file   Intimate Partner Violence:     Fear of Current or Ex-Partner: Not on file    Emotionally Abused: Not on file    Physically Abused: Not on file    Sexually Abused: Not on file   Housing Stability:     Unable to Pay for Housing in the Last Year: Not on file    Number of [place] in the Last Year: Not on file    Unstable Housing in the Last Year: Not on file       Review of Systems  Constitutional: Negative for fatigue, fever and unexpected weight change. HENT: Negative for congestion and trouble swallowing.    Eyes: Negative for pain and itching. Respiratory: Negative for cough and shortness of breath.    Cardiovascular: Negative for chest pain and leg swelling. Gastrointestinal: Positive for diarrhea. Negative for abdominal pain, constipation, and nausea. Endocrine: Negative for cold intolerance and heat intolerance. Genitourinary: See HPI. Musculoskeletal:  Negative for back pain and joint swelling. Skin: Negative for rash. Neurological: Negative for dizziness, weakness, numbness and headaches. Psychiatric/Behavioral: The patient is not nervous/anxious.        Exam    /76   Ht 5' 11.5\" (1.816 m)   Wt 169 lb (76.7 kg)   BMI 23.24 kg/m²     Constitutional: Oriented to person, place, and time. Vital signs are normal. Appears well-developed and well-nourished. Cooperative. No distress. HENT:    Head: Normocephalic and atraumatic.    Eyes: EOM are normal. Pupils are equal, round, and reactive to light. Right eye exhibits no discharge. Left eye exhibits no discharge. No scleral icterus.    Neck: Trachea normal. No JVD present.    Cardiovascular: Normal rate and regular rhythm. S1and S2 normal.  Pulmonary/Chest: Effort normal. No respiratory distress. No wheezes, rhonchi, or rales. Abdominal: Soft. Exhibits no generalized tenderness or distention. There is no rebound, rigidity, or guarding. No CVA tenderness. Lymphadenopathy: No superficial cervical or supraclavicular lymphadenopathy. Neurological: Alert and oriented to person, place, and time. No cranial nerve deficit. Skin: Skin is warm and dry. Not diaphoretic. Psychiatric: Normal mood and affect. Behavior is normal.   Nursing note and vitals reviewed      Labs    Results for POC orders placed in visit on 06/13/22   POCT Urinalysis No Micro (Auto)   Result Value Ref Range    Glucose, Ur Negative NEGATIVE mg/dl    Bilirubin Urine Negative     Ketones, Urine Negative NEGATIVE    Specific Gravity, Urine 1.020 1.002 - 1.030    Blood, UA POC Moderate (A) NEGATIVE    pH, Urine 6.00 5.0 - 9.0    Protein, Urine Negative NEGATIVE mg/dl    Urobilinogen, Urine 0.20 0.0 - 1.0 eu/dl    Nitrite, Urine Negative NEGATIVE    Leukocyte Clumps, Urine Negative NEGATIVE    Color, Urine Yellow YELLOW-STRAW    Character, Urine Clear [id number].[id number]       Lab Results   Component Value Date    CREATININE 1.0 08/13/2020    BUN 14 08/13/2020     08/13/2020    K 3.8 08/13/2020     08/13/2020    CO2 26 08/13/2020       Lab Results   Component Value Date    PSA 1.52 (H) 06/07/2022    PSA 1.53 (H) 05/23/2022    PSA 1.44 (H) 05/17/2021         Assessment/Plan:    Low Back /Perineal/Perianal Pain- Symptomatic but improved on recent addition of three-month Cipro therapy. Since his antibiotics have been discontinued, there has been mild return of perianal and perineal discomfort. Patient reported minimal to mild improvement only with initiation of pelvic floor therapy. Patient does report radicular pain from the bilateral hips and sacrum to the perineal/perianal area. A MRI of the lumbar spine was obtained.  He does have what appears to be congenital spinal stenosis. However, this does not fit with perineal/perianal pain. Pelvic XR demonstrated mild degenerative changes in each hip (right>left). PT physical examination revealed restricted left external hip rotation and restricted right internal hip rotation. MRI hip demonstrated small tear of left anterosuperior labrum. MRI of sacrum demonstrated mild to moderate L5-S1 neural foraminal stenosis. Continue Neurontin 100 mg po twice daily. Will start Medrol Dosepak. Discussed obtaining Ortho/Neuro Consultation for possible sacral nerve impingement from his sacral stenosis. Call immediately with worsening irritative and obstructive symptomatology, fever/chills, gross hematuria, etc.     Microscopic Hematuria- Urine dip positive for blood. Urine cytology from May 2020 was negative for atypical cells. Office cystoscopy was negative (9/21). Bladder Cx April 2022 is 0.03. Urine cytology today. Prostate Cancer Screening- PSA (6/7/22) is 1.52.  Will need repeat value in May 2023.

## 2022-08-08 ENCOUNTER — OFFICE VISIT (OUTPATIENT)
Dept: UROLOGY | Age: 52
End: 2022-08-08
Payer: COMMERCIAL

## 2022-08-08 VITALS
BODY MASS INDEX: 25.2 KG/M2 | HEIGHT: 70 IN | WEIGHT: 176 LBS | SYSTOLIC BLOOD PRESSURE: 128 MMHG | DIASTOLIC BLOOD PRESSURE: 70 MMHG

## 2022-08-08 DIAGNOSIS — K62.89 PERIANAL PAIN: ICD-10-CM

## 2022-08-08 DIAGNOSIS — R10.2 PERINEAL PAIN: ICD-10-CM

## 2022-08-08 DIAGNOSIS — R31.29 MICROSCOPIC HEMATURIA: Primary | ICD-10-CM

## 2022-08-08 PROCEDURE — 81003 URINALYSIS AUTO W/O SCOPE: CPT | Performed by: UROLOGY

## 2022-08-08 PROCEDURE — G8427 DOCREV CUR MEDS BY ELIG CLIN: HCPCS | Performed by: UROLOGY

## 2022-08-08 PROCEDURE — 3017F COLORECTAL CA SCREEN DOC REV: CPT | Performed by: UROLOGY

## 2022-08-08 PROCEDURE — 99213 OFFICE O/P EST LOW 20 MIN: CPT | Performed by: UROLOGY

## 2022-08-08 PROCEDURE — 1036F TOBACCO NON-USER: CPT | Performed by: UROLOGY

## 2022-08-08 PROCEDURE — G8419 CALC BMI OUT NRM PARAM NOF/U: HCPCS | Performed by: UROLOGY

## 2022-08-08 NOTE — PROGRESS NOTES
MD MD [facility] Consultation / New Patient Visit    Patient: Anton Cohen  YOB: 1970  Date: 8/8/2022  Consult requested from Jaquan Vilchis MD     HISTORY OF PRESENT ILLNESS:   The patient is a 49 y.o. male who presents today for follow-up for the following problem(s): Perineal pain  Overall the problem(s) : are worsening. Associated Symptoms: No dysuria, gross hematuria. Pain Severity:      Today visit:   8/8/22   Has perineal and perianal pain which may be MSK and paresthesia in nature as it is intermittent and exacerbated with physical activity. No LUTs, no signs of dysuria or prostatitis. Summary of old records:   (Patient's old records, notes and chart reviewed and summarized above.)  Previously saw rosanne  Mr. Vibha Rivera is a 76-year-old male with a several month history of left lower back/sciatic pain and deep, sharp pain radiating to the perianal and perineal areas. He has taken multiple rounds of four to six-week courses of antibiotics with worsening of his symptoms when the antibiotics were complete. -  He underwent several sessions of pelvic floor and low back/hip physical therapy with Bradly Martines with minimal effectiveness per patient.   - Neurontin was initiated, which he is taking twice daily with some effectiveness noted. 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 - He underwent an office cystoscopy with Dr. Edinson Kelsey on 9/15/21. Procedural findings included mild enlargement of the median lobe of the prostate with mild trabeculation noted. He was started on a three-month course of Ciprofloxacin. He states that he has noted improvement in his pelvic pressure and some of his irritative urinary symptoms with the extended course of Cipro, but he still is experiencing perineal and perianal pain. He describes this pain as almost a heat/burning sensation.  This radicular pain emminates from bilateral hip and lower back/sacral area to the perineum and perianal area. He does notice this more with a full bladder. He notices some discomfort with sexual activity. He is seeing GI for perianal discomfort and diarrhea. He presents today for further evaluation. Last several PSA's:  Lab Results   Component Value Date    PSA 1.52 (H) 06/07/2022    PSA 1.53 (H) 05/23/2022    PSA 1.44 (H) 05/17/2021       Last total testosterone:  No results found for: TESTOSTERONE    Urinalysis today:  No results found for this visit on 08/08/22. Last BUN and creatinine:  Lab Results   Component Value Date    BUN 14 08/13/2020     Lab Results   Component Value Date    CREATININE 1.0 08/13/2020       Imaging Reviewed during this Office Visit:   (results were independently reviewed by physician and radiology report verified)    PAST MEDICAL, FAMILY AND SOCIAL HISTORY:  Past Medical History:   Diagnosis Date    Bowel trouble      Past Surgical History:   Procedure Laterality Date    COLONOSCOPY  2020    SIGMOIDOSCOPY  2021     Family History   Problem Relation Age of Onset    Asthma Mother     COPD Mother     Other Father         aortic aneurysm    Stroke Paternal Grandfather      Outpatient Medications Marked as Taking for the 8/8/22 encounter (Office Visit) with Rene Cisneros MD   Medication Sig Dispense Refill    gabapentin (NEURONTIN) 100 MG capsule Take 1 capsule by mouth 3 times daily for 30 days. Intended supply: 90 days (Patient taking differently: Take 100 mg by mouth in the morning and at bedtime.  Intended supply: 90 days) 90 capsule 5    ibuprofen (ADVIL;MOTRIN) 200 MG CAPS       acetaminophen (TYLENOL) 325 MG tablet Take 650 mg by mouth every 6 hours as needed for Pain      Probiotic Product (PROBIOTIC ADVANCED PO) Take by mouth daily          Doxycycline  Social History     Tobacco Use   Smoking Status Never   Smokeless Tobacco Never       Social History     Substance and Sexual Activity   Alcohol Use No       REVIEW OF SYSTEMS:  Constitutional: negative  Eyes: negative  Respiratory: negative  Cardiovascular: negative  Gastrointestinal: negative  Musculoskeletal: negative  Genitourinary: negative  Skin: negative   Neurological: negative  Hematological/Lymphatic: negative  Psychological: negative    Physical Exam:    This a 49 y.o. male   Vitals:    08/08/22 0833   BP: 128/70     Constitutional: Patient in no acute distress   Neuro: alert and oriented to person place and time. Psych: Mood and affect normal.  Head: atraumatic normocephalic  Eyes: EOMi  HEENT: neck supple, trachea midline  Lungs: Respiratory effort normal  Cardiovascular:  Normal peripheral pulses  Abdomen: Soft, non-tender, non-distended, No CVA  Bladder: non-tender and not distended. FROMx4, no cyanosis clubbing edema  Skin: warm and dry      Assessment and Plan      1. Microscopic hematuria    2. Perineal pain    3. Perianal pain           Plan:      No follow-ups on file. Perineal and Perianal pain  - Cipro for intermittent prostatitis  - Neruontin    Would recommend rest and anti inflammatory. Physical therapy.

## 2024-07-29 SDOH — ECONOMIC STABILITY: FOOD INSECURITY: WITHIN THE PAST 12 MONTHS, THE FOOD YOU BOUGHT JUST DIDN'T LAST AND YOU DIDN'T HAVE MONEY TO GET MORE.: NEVER TRUE

## 2024-07-29 SDOH — ECONOMIC STABILITY: HOUSING INSECURITY
IN THE LAST 12 MONTHS, WAS THERE A TIME WHEN YOU DID NOT HAVE A STEADY PLACE TO SLEEP OR SLEPT IN A SHELTER (INCLUDING NOW)?: NO

## 2024-07-29 SDOH — ECONOMIC STABILITY: FOOD INSECURITY: WITHIN THE PAST 12 MONTHS, YOU WORRIED THAT YOUR FOOD WOULD RUN OUT BEFORE YOU GOT MONEY TO BUY MORE.: NEVER TRUE

## 2024-07-29 SDOH — ECONOMIC STABILITY: INCOME INSECURITY: HOW HARD IS IT FOR YOU TO PAY FOR THE VERY BASICS LIKE FOOD, HOUSING, MEDICAL CARE, AND HEATING?: NOT HARD AT ALL

## 2024-07-29 ASSESSMENT — PATIENT HEALTH QUESTIONNAIRE - PHQ9
SUM OF ALL RESPONSES TO PHQ QUESTIONS 1-9: 0
SUM OF ALL RESPONSES TO PHQ QUESTIONS 1-9: 0
SUM OF ALL RESPONSES TO PHQ9 QUESTIONS 1 & 2: 0
SUM OF ALL RESPONSES TO PHQ9 QUESTIONS 1 & 2: 0
SUM OF ALL RESPONSES TO PHQ QUESTIONS 1-9: 0
1. LITTLE INTEREST OR PLEASURE IN DOING THINGS: NOT AT ALL
2. FEELING DOWN, DEPRESSED OR HOPELESS: NOT AT ALL
2. FEELING DOWN, DEPRESSED OR HOPELESS: NOT AT ALL
SUM OF ALL RESPONSES TO PHQ QUESTIONS 1-9: 0

## 2024-07-31 ENCOUNTER — OFFICE VISIT (OUTPATIENT)
Dept: FAMILY MEDICINE CLINIC | Age: 54
End: 2024-07-31
Payer: COMMERCIAL

## 2024-07-31 VITALS
BODY MASS INDEX: 23.93 KG/M2 | TEMPERATURE: 98 F | HEART RATE: 110 BPM | DIASTOLIC BLOOD PRESSURE: 80 MMHG | WEIGHT: 166.8 LBS | RESPIRATION RATE: 16 BRPM | SYSTOLIC BLOOD PRESSURE: 140 MMHG

## 2024-07-31 DIAGNOSIS — K52.9 COLITIS: ICD-10-CM

## 2024-07-31 DIAGNOSIS — I10 PRIMARY HYPERTENSION: Primary | ICD-10-CM

## 2024-07-31 DIAGNOSIS — Z12.5 SCREENING FOR PROSTATE CANCER: ICD-10-CM

## 2024-07-31 DIAGNOSIS — R00.0 TACHYCARDIA: ICD-10-CM

## 2024-07-31 PROCEDURE — 1036F TOBACCO NON-USER: CPT | Performed by: FAMILY MEDICINE

## 2024-07-31 PROCEDURE — 3077F SYST BP >= 140 MM HG: CPT | Performed by: FAMILY MEDICINE

## 2024-07-31 PROCEDURE — G8420 CALC BMI NORM PARAMETERS: HCPCS | Performed by: FAMILY MEDICINE

## 2024-07-31 PROCEDURE — G8427 DOCREV CUR MEDS BY ELIG CLIN: HCPCS | Performed by: FAMILY MEDICINE

## 2024-07-31 PROCEDURE — 99214 OFFICE O/P EST MOD 30 MIN: CPT | Performed by: FAMILY MEDICINE

## 2024-07-31 PROCEDURE — 3079F DIAST BP 80-89 MM HG: CPT | Performed by: FAMILY MEDICINE

## 2024-07-31 PROCEDURE — 3017F COLORECTAL CA SCREEN DOC REV: CPT | Performed by: FAMILY MEDICINE

## 2024-07-31 RX ORDER — LOSARTAN POTASSIUM 50 MG/1
50 TABLET ORAL DAILY
Qty: 30 TABLET | Refills: 11 | Status: SHIPPED | OUTPATIENT
Start: 2024-07-31

## 2024-07-31 ASSESSMENT — ENCOUNTER SYMPTOMS
COUGH: 0
ABDOMINAL PAIN: 0
SHORTNESS OF BREATH: 0
DIARRHEA: 0

## 2024-07-31 NOTE — PROGRESS NOTES
2024    London Simpson (:  1970) is a 53 y.o. male, Established patient, here for evaluation of the following chief complaint(s):  Blood Pressure Check (High blood pressure )      ASSESSMENT/PLAN:        1. Primary hypertension  -     Comprehensive Metabolic Panel; Future  -     Lipid Panel; Future  -     losartan (COZAAR) 50 MG tablet; Take 1 tablet by mouth daily, Disp-30 tablet, R-11Normal  2. Tachycardia  -     Comprehensive Metabolic Panel; Future  -     TSH; Future  -     T4, Free; Future  3. Screening for prostate cancer  -     PSA Screening; Future  4. Colitis    Start losartan 50 mg daily for hypertension    He is encouraged to adhere to a low-sodium diet and get regular physical activity including aerobic exercise    Labs as above    Patient reports that his colitis symptoms are controlled at this point.  He will plan on doing a follow-up colonoscopy next summer with GI.  He will notify them if his symptoms return    Follow-up here in 3 to 4 weeks for annual physical and review of lab test results along with blood pressure recheck      SUBJECTIVE/OBJECTIVE:    HPI    Patient here today with complaints of high blood pressure over the last few weeks.  Blood pressures are running in the 140-150 range systolic and in the 90s on the diastolic side.  He does have a family history of high blood pressure and his parents and a sibling.  He denies any chest pain, shortness of breath, dizziness, or lightheadedness.  No headaches.  He does get regular exercise with walking and has maintain a healthy weight.  He is a non-smoker.  Additionally, he has questions with regards to the diagnosis of colitis that was made during a colonoscopy in .  At that time, he was having bowel symptoms including diarrhea and bloody stool.  Entocort was suggested for him by the GI specialist but the patient never got the message and never took the treatment.  Nonetheless, he states that his symptoms are improved at

## 2024-08-14 ENCOUNTER — HOSPITAL ENCOUNTER (OUTPATIENT)
Age: 54
Discharge: HOME | End: 2024-08-14
Payer: COMMERCIAL

## 2024-08-14 DIAGNOSIS — R00.0 TACHYCARDIA: ICD-10-CM

## 2024-08-14 DIAGNOSIS — I10 PRIMARY HYPERTENSION: ICD-10-CM

## 2024-08-14 DIAGNOSIS — Z12.5 SCREENING FOR PROSTATE CANCER: ICD-10-CM

## 2024-08-14 LAB
ALBUMIN SERPL BCG-MCNC: 3.8 G/DL (ref 3.5–5.1)
ALP SERPL-CCNC: 95 U/L (ref 38–126)
ALT SERPL W/O P-5'-P-CCNC: 11 U/L (ref 11–66)
ANION GAP SERPL CALC-SCNC: 10 MEQ/L (ref 8–16)
AST SERPL-CCNC: 22 U/L (ref 5–40)
BILIRUB SERPL-MCNC: 0.8 MG/DL (ref 0.3–1.2)
BUN SERPL-MCNC: 14 MG/DL (ref 7–22)
CALCIUM SERPL-MCNC: 8.8 MG/DL (ref 8.5–10.5)
CHLORIDE SERPL-SCNC: 105 MEQ/L (ref 98–111)
CHOLEST SERPL-MCNC: 182 MG/DL (ref 100–199)
CO2 SERPL-SCNC: 25 MEQ/L (ref 23–33)
CREAT SERPL-MCNC: 1.1 MG/DL (ref 0.4–1.2)
GFR SERPL CREATININE-BSD FRML MDRD: 80 ML/MIN/1.73M2
GLUCOSE SERPL-MCNC: 96 MG/DL (ref 70–108)
HDLC SERPL-MCNC: 42 MG/DL
LDLC SERPL CALC-MCNC: 120 MG/DL
POTASSIUM SERPL-SCNC: 4 MEQ/L (ref 3.5–5.2)
PROT SERPL-MCNC: 7.2 G/DL (ref 6.1–8)
PSA SERPL-MCNC: 2.04 NG/ML (ref 0–1)
SODIUM SERPL-SCNC: 140 MEQ/L (ref 135–145)
T4 FREE SERPL-MCNC: 1.24 NG/DL (ref 0.93–1.68)
TRIGL SERPL-MCNC: 98 MG/DL (ref 0–199)
TSH SERPL DL<=0.005 MIU/L-ACNC: 4.02 UIU/ML (ref 0.4–4.2)

## 2024-08-14 PROCEDURE — G0103 PSA SCREENING: HCPCS

## 2024-08-14 PROCEDURE — 80053 COMPREHEN METABOLIC PANEL: CPT

## 2024-08-14 PROCEDURE — 84439 ASSAY OF FREE THYROXINE: CPT

## 2024-08-14 PROCEDURE — 84443 ASSAY THYROID STIM HORMONE: CPT

## 2024-08-14 PROCEDURE — 80061 LIPID PANEL: CPT

## 2024-08-14 PROCEDURE — 36415 COLL VENOUS BLD VENIPUNCTURE: CPT

## 2024-09-03 ENCOUNTER — OFFICE VISIT (OUTPATIENT)
Dept: FAMILY MEDICINE CLINIC | Age: 54
End: 2024-09-03
Payer: COMMERCIAL

## 2024-09-03 VITALS
TEMPERATURE: 97.8 F | WEIGHT: 169.8 LBS | RESPIRATION RATE: 14 BRPM | SYSTOLIC BLOOD PRESSURE: 130 MMHG | HEART RATE: 100 BPM | BODY MASS INDEX: 22.5 KG/M2 | DIASTOLIC BLOOD PRESSURE: 88 MMHG | HEIGHT: 73 IN

## 2024-09-03 DIAGNOSIS — Z00.00 ANNUAL PHYSICAL EXAM: Primary | ICD-10-CM

## 2024-09-03 DIAGNOSIS — I10 PRIMARY HYPERTENSION: ICD-10-CM

## 2024-09-03 PROBLEM — R10.2 PERINEAL PAIN: Status: RESOLVED | Noted: 2021-01-21 | Resolved: 2024-09-03

## 2024-09-03 PROCEDURE — 3079F DIAST BP 80-89 MM HG: CPT | Performed by: FAMILY MEDICINE

## 2024-09-03 PROCEDURE — 99396 PREV VISIT EST AGE 40-64: CPT | Performed by: FAMILY MEDICINE

## 2024-09-03 PROCEDURE — 3075F SYST BP GE 130 - 139MM HG: CPT | Performed by: FAMILY MEDICINE

## 2024-09-03 ASSESSMENT — ENCOUNTER SYMPTOMS
SHORTNESS OF BREATH: 0
CHEST TIGHTNESS: 0
ABDOMINAL PAIN: 0
BLOOD IN STOOL: 0
EYES NEGATIVE: 1

## 2024-09-03 NOTE — PROGRESS NOTES
9/3/2024    Chief Complaint   Patient presents with    Annual Exam     Annual PE and discuss labs. Check up for BP medications and BP       London Simpson (:  1970) is a 53 y.o. male, here for a preventive medicine evaluation.    Subjective   Patient Active Problem List   Diagnosis    Prostatitis, chronic    Primary hypertension     Patient recently started losartan 50 mg daily for hypertension.  He has not been checking blood pressures but is thinking of investing in a blood pressure cuff soon.  He takes medication daily and denies side effects.  He feels well overall.  He gets regular exercise.  He is maintained a healthy weight.  He denies chest pain, shortness of breath, dizziness, or lightheadedness.  No bowel or bladder issues.  He has a history of colitis but is currently asymptomatic.  Colon and prostate cancer screens are up-to-date.  Non-smoker.  BMI 22.71.      Review of Systems   Constitutional:  Negative for chills, fatigue, fever and unexpected weight change.   HENT: Negative.     Eyes: Negative.    Respiratory:  Negative for chest tightness and shortness of breath.    Cardiovascular:  Negative for chest pain, palpitations and leg swelling.   Gastrointestinal:  Negative for abdominal pain and blood in stool.   Genitourinary:  Positive for hematuria. Negative for dysuria.   Musculoskeletal: Negative.    Skin:  Negative for rash.   Neurological:  Negative for dizziness and headaches.   Psychiatric/Behavioral: Negative.     All other systems reviewed and are negative.      Prior to Visit Medications    Medication Sig Taking? Authorizing Provider   losartan (COZAAR) 50 MG tablet Take 1 tablet by mouth daily Yes Harmony Ibarra MD   ibuprofen (ADVIL;MOTRIN) 200 MG CAPS  Yes ProviderBrayden MD   acetaminophen (TYLENOL) 325 MG tablet Take 2 tablets by mouth every 6 hours as needed for Pain Yes ProviderBrayden MD   Probiotic Product (PROBIOTIC ADVANCED PO) Take by mouth daily  Yes

## 2025-02-28 SDOH — ECONOMIC STABILITY: FOOD INSECURITY: WITHIN THE PAST 12 MONTHS, THE FOOD YOU BOUGHT JUST DIDN'T LAST AND YOU DIDN'T HAVE MONEY TO GET MORE.: NEVER TRUE

## 2025-02-28 SDOH — ECONOMIC STABILITY: INCOME INSECURITY: IN THE LAST 12 MONTHS, WAS THERE A TIME WHEN YOU WERE NOT ABLE TO PAY THE MORTGAGE OR RENT ON TIME?: NO

## 2025-02-28 SDOH — ECONOMIC STABILITY: FOOD INSECURITY: WITHIN THE PAST 12 MONTHS, YOU WORRIED THAT YOUR FOOD WOULD RUN OUT BEFORE YOU GOT MONEY TO BUY MORE.: NEVER TRUE

## 2025-02-28 ASSESSMENT — PATIENT HEALTH QUESTIONNAIRE - PHQ9
1. LITTLE INTEREST OR PLEASURE IN DOING THINGS: NOT AT ALL
SUM OF ALL RESPONSES TO PHQ QUESTIONS 1-9: 0
2. FEELING DOWN, DEPRESSED OR HOPELESS: NOT AT ALL
SUM OF ALL RESPONSES TO PHQ QUESTIONS 1-9: 0
2. FEELING DOWN, DEPRESSED OR HOPELESS: NOT AT ALL
SUM OF ALL RESPONSES TO PHQ9 QUESTIONS 1 & 2: 0
1. LITTLE INTEREST OR PLEASURE IN DOING THINGS: NOT AT ALL

## 2025-03-03 ENCOUNTER — OFFICE VISIT (OUTPATIENT)
Dept: FAMILY MEDICINE CLINIC | Age: 55
End: 2025-03-03
Payer: COMMERCIAL

## 2025-03-03 VITALS
DIASTOLIC BLOOD PRESSURE: 84 MMHG | TEMPERATURE: 97.5 F | HEART RATE: 104 BPM | SYSTOLIC BLOOD PRESSURE: 138 MMHG | RESPIRATION RATE: 16 BRPM | WEIGHT: 176.6 LBS | BODY MASS INDEX: 23.62 KG/M2

## 2025-03-03 DIAGNOSIS — Z12.5 SCREENING FOR PROSTATE CANCER: ICD-10-CM

## 2025-03-03 DIAGNOSIS — I10 PRIMARY HYPERTENSION: Primary | ICD-10-CM

## 2025-03-03 PROCEDURE — 3078F DIAST BP <80 MM HG: CPT | Performed by: FAMILY MEDICINE

## 2025-03-03 PROCEDURE — G8420 CALC BMI NORM PARAMETERS: HCPCS | Performed by: FAMILY MEDICINE

## 2025-03-03 PROCEDURE — 3075F SYST BP GE 130 - 139MM HG: CPT | Performed by: FAMILY MEDICINE

## 2025-03-03 PROCEDURE — 3017F COLORECTAL CA SCREEN DOC REV: CPT | Performed by: FAMILY MEDICINE

## 2025-03-03 PROCEDURE — G8427 DOCREV CUR MEDS BY ELIG CLIN: HCPCS | Performed by: FAMILY MEDICINE

## 2025-03-03 PROCEDURE — 1036F TOBACCO NON-USER: CPT | Performed by: FAMILY MEDICINE

## 2025-03-03 PROCEDURE — G2211 COMPLEX E/M VISIT ADD ON: HCPCS | Performed by: FAMILY MEDICINE

## 2025-03-03 PROCEDURE — 99213 OFFICE O/P EST LOW 20 MIN: CPT | Performed by: FAMILY MEDICINE

## 2025-03-03 ASSESSMENT — ENCOUNTER SYMPTOMS
RESPIRATORY NEGATIVE: 1
GASTROINTESTINAL NEGATIVE: 1
SHORTNESS OF BREATH: 0

## 2025-03-03 NOTE — PROGRESS NOTES
to generate a clinical note, and support improvement of the AI technology. The patient (or guardian, if applicable) and other individuals in attendance at the appointment consented to the use of AI, including the recording.

## 2025-07-19 DIAGNOSIS — I10 PRIMARY HYPERTENSION: ICD-10-CM

## 2025-07-21 RX ORDER — LOSARTAN POTASSIUM 50 MG/1
50 TABLET ORAL DAILY
Qty: 30 TABLET | Refills: 0 | Status: SHIPPED | OUTPATIENT
Start: 2025-07-21

## 2025-07-21 NOTE — TELEPHONE ENCOUNTER
This medication refill is regarding a electronic request. Refill requested by patient.    Requested Prescriptions     Pending Prescriptions Disp Refills    losartan (COZAAR) 50 MG tablet [Pharmacy Med Name: Losartan Potassium 50 MG Oral Tablet] 30 tablet 0     Sig: Take 1 tablet by mouth once daily     Date of last visit: 3/3/2025   Date of next visit: 9/9/2025  Date of last refill: 7/31/24#30/11  Pharmacy Name: : [facility] [address] [place] [address] [phone] -  [phone]     Last Lipid Panel:    Lab Results   Component Value Date/Time    CHOL 182 08/14/2024 07:19 AM    TRIG 98 08/14/2024 07:19 AM    HDL 42 08/14/2024 07:19 AM     Last CMP:   Lab Results   Component Value Date     08/14/2024    K 4.0 08/14/2024     08/14/2024    CO2 25 08/14/2024    BUN 14 08/14/2024    CREATININE 1.1 08/14/2024    GLUCOSE 96 08/14/2024    CALCIUM 8.8 08/14/2024    BILITOT 0.8 08/14/2024    ALKPHOS 95 08/14/2024    AST 22 08/14/2024    ALT 11 08/14/2024    LABGLOM 80 08/14/2024       Last Thyroid:    Lab Results   Component Value Date    TSH 4.020 08/14/2024    T4FREE 1.24 08/14/2024     Last Hemoglobin A1C:  No results found for: \"LABA1C\"    Rx verified, ordered and set to EP.

## 2025-08-20 DIAGNOSIS — I10 PRIMARY HYPERTENSION: ICD-10-CM

## 2025-08-21 RX ORDER — LOSARTAN POTASSIUM 50 MG/1
50 TABLET ORAL DAILY
Qty: 30 TABLET | Refills: 0 | Status: SHIPPED | OUTPATIENT
Start: 2025-08-21

## 2025-09-05 ENCOUNTER — HOSPITAL ENCOUNTER (OUTPATIENT)
Dept: OTHER | Age: 55
Discharge: HOME | End: 2025-09-05
Payer: COMMERCIAL

## 2025-09-05 DIAGNOSIS — Z12.5 SCREENING FOR PROSTATE CANCER: ICD-10-CM

## 2025-09-05 DIAGNOSIS — I10 PRIMARY HYPERTENSION: ICD-10-CM

## 2025-09-05 LAB
ALBUMIN SERPL BCG-MCNC: 3.8 G/DL (ref 3.4–4.9)
ALP SERPL-CCNC: 89 U/L (ref 40–129)
ALT SERPL W/O P-5'-P-CCNC: 11 U/L (ref 10–50)
ANION GAP SERPL CALC-SCNC: 9 MEQ/L (ref 8–16)
AST SERPL-CCNC: 25 U/L (ref 10–50)
BILIRUB SERPL-MCNC: 0.7 MG/DL (ref 0.3–1.2)
BUN SERPL-MCNC: 21 MG/DL (ref 8–23)
CALCIUM SERPL-MCNC: 9.3 MG/DL (ref 8.5–10.5)
CHLORIDE SERPL-SCNC: 105 MEQ/L (ref 98–111)
CHOLEST SERPL-MCNC: 163 MG/DL (ref 100–199)
CO2 SERPL-SCNC: 26 MEQ/L (ref 22–29)
CREAT SERPL-MCNC: 1.2 MG/DL (ref 0.7–1.2)
GFR SERPL CREATININE-BSD FRML MDRD: 72 ML/MIN/1.73M2
GLUCOSE SERPL-MCNC: 100 MG/DL (ref 74–109)
HDLC SERPL-MCNC: 35 MG/DL
LDLC SERPL CALC-MCNC: 111 MG/DL
POTASSIUM SERPL-SCNC: 4.1 MEQ/L (ref 3.5–5.2)
PROT SERPL-MCNC: 6.7 G/DL (ref 6.4–8.3)
PSA SERPL-MCNC: 1.54 NG/ML (ref 0–1)
SODIUM SERPL-SCNC: 140 MEQ/L (ref 135–145)
TRIGL SERPL-MCNC: 83 MG/DL (ref 0–199)

## 2025-09-05 PROCEDURE — 36415 COLL VENOUS BLD VENIPUNCTURE: CPT

## 2025-09-05 PROCEDURE — G0103 PSA SCREENING: HCPCS

## 2025-09-05 PROCEDURE — 80061 LIPID PANEL: CPT

## 2025-09-05 PROCEDURE — 80053 COMPREHEN METABOLIC PANEL: CPT
